# Patient Record
Sex: FEMALE | Race: WHITE | NOT HISPANIC OR LATINO | Employment: FULL TIME | URBAN - METROPOLITAN AREA
[De-identification: names, ages, dates, MRNs, and addresses within clinical notes are randomized per-mention and may not be internally consistent; named-entity substitution may affect disease eponyms.]

---

## 2017-01-27 ENCOUNTER — HOSPITAL ENCOUNTER (OUTPATIENT)
Dept: SLEEP MEDICINE | Facility: HOSPITAL | Age: 37
Discharge: HOME | End: 2017-01-27
Attending: INTERNAL MEDICINE | Admitting: INTERNAL MEDICINE

## 2017-01-27 VITALS
HEIGHT: 67 IN | BODY MASS INDEX: 33.4 KG/M2 | WEIGHT: 212.8 LBS | HEART RATE: 71 BPM | OXYGEN SATURATION: 95 % | SYSTOLIC BLOOD PRESSURE: 119 MMHG | DIASTOLIC BLOOD PRESSURE: 65 MMHG

## 2017-01-27 DIAGNOSIS — G47.10 HYPERSOMNIA: ICD-10-CM

## 2017-01-27 DIAGNOSIS — R06.83 SNORING: ICD-10-CM

## 2017-01-27 PROCEDURE — 95810 POLYSOM 6/> YRS 4/> PARAM: CPT | Performed by: INTERNAL MEDICINE

## 2017-01-28 ENCOUNTER — HOSPITAL ENCOUNTER (OUTPATIENT)
Dept: SLEEP MEDICINE | Facility: HOSPITAL | Age: 37
Discharge: HOME | End: 2017-01-28
Attending: INTERNAL MEDICINE | Admitting: INTERNAL MEDICINE

## 2017-01-28 VITALS
DIASTOLIC BLOOD PRESSURE: 65 MMHG | WEIGHT: 212 LBS | SYSTOLIC BLOOD PRESSURE: 119 MMHG | HEART RATE: 70 BPM | OXYGEN SATURATION: 97 % | HEIGHT: 67 IN | BODY MASS INDEX: 33.27 KG/M2 | RESPIRATION RATE: 16 BRPM

## 2017-01-28 DIAGNOSIS — G47.10 HYPERSOMNIA: ICD-10-CM

## 2017-01-28 LAB
AMPHET+METHAMPHET UR QL: NEGATIVE
AMPHETAMINES UR QL: NEGATIVE
BARBITURATES UR QL SCN: NEGATIVE
BENZODIAZ UR QL SCN: NEGATIVE
BUPRENORPHINE SERPL-MCNC: POSITIVE NG/ML
CANNABINOIDS SERPL QL: NEGATIVE
COCAINE UR QL: NEGATIVE
METHADONE UR QL SCN: NEGATIVE
OPIATES UR QL: NEGATIVE
OXYCODONE UR QL SCN: NEGATIVE
PCP UR QL SCN: NEGATIVE
PROPOXYPH UR QL: NEGATIVE
TRICYCLICS UR QL SCN: NEGATIVE

## 2017-01-28 PROCEDURE — 95805 MULTIPLE SLEEP LATENCY TEST: CPT | Performed by: INTERNAL MEDICINE

## 2017-01-28 PROCEDURE — 80306 DRUG TEST PRSMV INSTRMNT: CPT | Performed by: INTERNAL MEDICINE

## 2017-02-01 ENCOUNTER — OFFICE VISIT (OUTPATIENT)
Dept: SLEEP MEDICINE | Facility: HOSPITAL | Age: 37
End: 2017-02-01

## 2017-02-01 VITALS
HEIGHT: 67 IN | OXYGEN SATURATION: 96 % | WEIGHT: 208 LBS | HEART RATE: 62 BPM | BODY MASS INDEX: 32.65 KG/M2 | SYSTOLIC BLOOD PRESSURE: 136 MMHG | DIASTOLIC BLOOD PRESSURE: 74 MMHG

## 2017-02-01 DIAGNOSIS — R06.83 SNORING: Primary | ICD-10-CM

## 2017-02-01 DIAGNOSIS — G47.10 HYPERSOMNIA: ICD-10-CM

## 2017-02-01 PROCEDURE — 99214 OFFICE O/P EST MOD 30 MIN: CPT | Performed by: INTERNAL MEDICINE

## 2017-02-01 RX ORDER — NITROFURANTOIN 25; 75 MG/1; MG/1
CAPSULE ORAL
COMMUNITY
Start: 2017-01-03 | End: 2017-09-29

## 2017-02-01 RX ORDER — CIPROFLOXACIN 500 MG/1
TABLET, FILM COATED ORAL
COMMUNITY
Start: 2017-01-30 | End: 2017-09-29

## 2017-02-01 RX ORDER — METHYLPHENIDATE HYDROCHLORIDE 20 MG/1
20 TABLET ORAL 2 TIMES DAILY
Qty: 60 TABLET | Refills: 0 | Status: SHIPPED | OUTPATIENT
Start: 2017-02-01 | End: 2017-06-13 | Stop reason: SDUPTHER

## 2017-02-01 RX ORDER — CYCLOBENZAPRINE HCL 10 MG
TABLET ORAL
COMMUNITY
Start: 2017-01-03 | End: 2017-09-29

## 2017-02-01 RX ORDER — BETAMETHASONE DIPROPIONATE 0.5 MG/G
CREAM TOPICAL
COMMUNITY
Start: 2016-12-22 | End: 2020-01-10

## 2017-02-01 RX ORDER — AMOXICILLIN AND CLAVULANATE POTASSIUM 875; 125 MG/1; MG/1
TABLET, FILM COATED ORAL
COMMUNITY
Start: 2016-12-22 | End: 2017-09-29

## 2017-02-01 NOTE — PROGRESS NOTES
"Aleena Mcdermott is a 36 y.o. female is here today for follow-up.Patient's followed here with a history of snoring and hypersomnia.  She was last seen here since September 21.  Her primary care physician is Dr. Villarreal.    History of Present Illness  Patient was last seen here September 21.  She has a history of snoring and hypersomnia.  We had previously done polysomnogram that did not document significant sleep-disordered breathing.  She had a repeat polysomnogram last month and then stayed for [facility] SLT.  She thought she was very sleepy during the overnight part.  She had difficulty staying awake between the naps for her him SLT.  She apparently often gets home about 2 AM and then is up and down quite a bit during the night due to frequent urinations.  She has been helped some by being on the Ritalin  The following portions of the patient's history were reviewed and updated as appropriate: allergies, current medications and problem list.    Review of Systems   Constitutional: Negative.    HENT: Positive for congestion, postnasal drip and sinus pressure.    Eyes: Negative.    Respiratory: Positive for cough.    Cardiovascular: Negative.    Gastrointestinal: Positive for constipation.   Endocrine: Negative.    Genitourinary: Positive for dysuria and frequency.   Musculoskeletal: Positive for arthralgias, joint swelling and myalgias.   Skin: Positive for rash.   Allergic/Immunologic: Negative.    Neurological: Positive for headaches.   Hematological: Negative.    Psychiatric/Behavioral: Negative.    [place] scores 20/24    Objective    Visit Vitals   • /74   • Pulse 62   • Ht 67\" (170.2 cm)   • Wt 208 lb (94.3 kg)   • SpO2 96%   • BMI 32.58 kg/m2       Physical Exam   Constitutional: She is oriented to person, place, and time. She appears well-developed and well-nourished.   She is obese.   HENT:   Head: Normocephalic and atraumatic.   She has nasal airway narrowing and Mallampati class IV " anatomy   Eyes: EOM are normal. Pupils are equal, round, and reactive to light.   Neck: Normal range of motion. Neck supple.   Cardiovascular: Normal rate, regular rhythm and normal heart sounds.    Pulmonary/Chest: Effort normal and breath sounds normal.   Abdominal: Soft. Bowel sounds are normal.   Musculoskeletal: Normal range of motion. She exhibits no edema.   Neurological: She is alert and oriented to person, place, and time.   Skin: Skin is warm and dry.   Psychiatric: She has a normal mood and affect. Her behavior is normal.   her overnight polysomnogram showed a normal AHI.  She was noted to have arousals with just snoring.  These could not be scored for sleep-disordered breathing.  Her multiple sleep latency tests showed she went to sleep every nap she did not have REM sleep in any of the naps.  Her average sleep latency was 3 minutes 42 seconds which is significant.    Assessment/Plan   Shelby was seen today for follow-up.    Diagnoses and all orders for this visit:    Snoring    Hypersomnia  -     methylphenidate (RITALIN) 20 MG tablet; Take 1 tablet by mouth 2 (Two) Times a Day.    patient does have marked daytime hypersomnolence but to this point does not show evidence of narcolepsy.  We will try treating her hypersomnolence with the Ritalin we will increase her to 20 mg at breakfast and 20 mg at noon.  Her urine drug screen did show evidence of her Suboxone therapy.  Her Roney report was reviewed and was #[id number]    She is encouraged to lose weight.  She is encouraged to avoid alcohol and sedatives close to bedtime.  She is encouraged practice lateral position sleep.  She is to contact us when she needs a refill on the Ritalin.  We will see her in 6 months.    Alberto Marquez MD [facility]  Sleep Medicine  Pulmonary and Critical Care Medicine

## 2017-02-01 NOTE — PATIENT INSTRUCTIONS
Hypersomnia  Hypersomnia is when you feel extremely tired during the day even though you're getting plenty of sleep at night. You may need to take naps during the day, and you may also be extremely difficult to wake up when you are sleeping.   CAUSES   The cause of your hypersomnia may not be known. Hypersomnia may be caused by:   · Medicines.  · Sleep disorders, such as narcolepsy.  · Trauma or injury to your head or nervous system.  · Using drugs or alcohol.  · Tumors.  · Medical conditions, such as depression or hypothyroidism.  · Genetics.  SIGNS AND SYMPTOMS   The main symptoms of hypersomnia include:   · Feeling extremely tired throughout the day.  · Being very difficult to wake up.  · Sleeping for longer and longer periods.  · Taking naps throughout the day.  Other symptoms may include:   · Feeling:  ¨ Restless.  ¨ Annoyed.  ¨ Anxious.  ¨ Low energy.  · Having difficulty:  ¨ Remembering.  ¨ Speaking.  ¨ Thinking.  · Losing your appetite.  · Experiencing hallucinations.  DIAGNOSIS   Hypersomnia may be diagnosed by:  · Medical history and physical exam. This will include a sleep history.  · Completing sleep logs.  · Tests may also be done, such as:    Polysomnography.    Multiple sleep latency test (MSLT).  TREATMENT   There is no cure for hypersomnia, but treatment can be very effective in helping manage the condition. Treatment may include:  · Lifestyle and sleeping strategies to help cope with the condition.  · Stimulant medicines.  · Treating any underlying causes of hypersomnia.  HOME CARE INSTRUCTIONS  · Take medicines only as directed by your health care provider.  · Schedule short naps for when you feel sleepiest during the day. Tell your employer or teachers that you have hypersomnia. You may be able to adjust your schedule to include time for naps.  · Avoid drinking alcohol or caffeinated beverages.  · Do not eat a heavy meal before bedtime. Eat at about the same times every day.  · Do not drive or  operate heavy machinery if you are sleepy.  · Do not swim or go out on the water without a life jacket.  · If possible, adjust your schedule so that you do not have to work or be active at night.  · Keep all follow-up visits as directed by your health care provider. This is important.  SEEK MEDICAL CARE IF:   · You have new symptoms.   · Your symptoms get worse.  SEEK IMMEDIATE MEDICAL CARE IF:   You have serious thoughts of hurting yourself or someone else.      This information is not intended to replace advice given to you by your health care provider. Make sure you discuss any questions you have with your health care provider.     Document Released: 12/08/2003 Document Revised: 01/08/2016 Document Reviewed: 07/23/2015  [employer] Interactive Patient Education ©2016 Elsevier Inc.

## 2017-04-13 DIAGNOSIS — G47.10 HYPERSOMNIA: ICD-10-CM

## 2017-04-13 NOTE — TELEPHONE ENCOUNTER
Patient called and left voicemail requesting refill for Rx Ritalin 20 mg. Patient was last seen 2/1/2017.

## 2017-04-19 RX ORDER — METHYLPHENIDATE HYDROCHLORIDE 20 MG/1
20 TABLET ORAL 2 TIMES DAILY
Qty: 60 TABLET | Refills: 0 | Status: CANCELLED | OUTPATIENT
Start: 2017-04-19

## 2017-04-19 NOTE — TELEPHONE ENCOUNTER
Rx written  Alberto Marquez MD [facility]  Sleep Medicine  Pulmonary and Critical Care Medicine

## 2017-06-13 DIAGNOSIS — G47.10 HYPERSOMNIA: ICD-10-CM

## 2017-06-13 NOTE — TELEPHONE ENCOUNTER
Patient last seen 02- is requesting refill for Ritalin 20 Mg.      She has scheduled her appointment in August for 6 Month follow up.

## 2017-06-23 RX ORDER — METHYLPHENIDATE HYDROCHLORIDE 20 MG/1
20 TABLET ORAL 2 TIMES DAILY
Qty: 60 TABLET | Refills: 0 | Status: SHIPPED | OUTPATIENT
Start: 2017-06-23 | End: 2017-09-29 | Stop reason: ALTCHOICE

## 2017-06-23 NOTE — TELEPHONE ENCOUNTER
Approved  Alberto Marquez MD [facility]  Sleep Medicine  Pulmonary and Critical Care Medicine

## 2017-09-27 ENCOUNTER — DOCUMENTATION (OUTPATIENT)
Dept: SLEEP MEDICINE | Facility: HOSPITAL | Age: 37
End: 2017-09-27

## 2017-09-27 NOTE — PROGRESS NOTES
PATIENT CALLED AND SAID THAT SHE COULD NOT MAKE HER APPOINTMENT. THIS IS HER 2ND NO SHOW THIS WEEK. INFORMED PATIENT THAT SHE CANNOT GET ANY MORE RX UNTIL SHE FOLLOWS UP.

## 2017-09-29 ENCOUNTER — OFFICE VISIT (OUTPATIENT)
Dept: SLEEP MEDICINE | Facility: HOSPITAL | Age: 37
End: 2017-09-29

## 2017-09-29 VITALS
HEART RATE: 78 BPM | SYSTOLIC BLOOD PRESSURE: 132 MMHG | OXYGEN SATURATION: 96 % | DIASTOLIC BLOOD PRESSURE: 72 MMHG | BODY MASS INDEX: 32.96 KG/M2 | HEIGHT: 67 IN | WEIGHT: 210 LBS

## 2017-09-29 DIAGNOSIS — G47.10 HYPERSOMNIA: Primary | ICD-10-CM

## 2017-09-29 PROCEDURE — 99214 OFFICE O/P EST MOD 30 MIN: CPT | Performed by: INTERNAL MEDICINE

## 2017-09-29 RX ORDER — DEXTROAMPHETAMINE SACCHARATE, AMPHETAMINE ASPARTATE, DEXTROAMPHETAMINE SULFATE AND AMPHETAMINE SULFATE 2.5; 2.5; 2.5; 2.5 MG/1; MG/1; MG/1; MG/1
10 TABLET ORAL 2 TIMES DAILY
Qty: 60 TABLET | Refills: 0 | Status: SHIPPED | OUTPATIENT
Start: 2017-09-29 | End: 2017-12-06 | Stop reason: SDUPTHER

## 2017-09-29 NOTE — PROGRESS NOTES
Subjective   Shelby Mcdermott is a 37 y.o. female is here today for follow-up.  She is followed here with snoring and hypersomnia.  Her primary care physician is Dr. Villarreal    History of Present Illness  Patient was last seen February 1, 2017.  She is previously been shown to have snoring and had hypersomnia on MSLT.  She is been on Ritalin 20 mg twice a day.  She says she thinks it Ritalin losing some effectiveness.  She noted herself being sleepy over the past 2 weeks.  She is previously had holidays from this medicine trying Adderall and it  work well for her previously.  She says she's trying to decrease smoking and  is eating better.  She's also trying to decrease some of her other medications.  She is been on chronic narcotic replacement therapy.  Past Medical History:   Diagnosis Date   • Diabetes mellitus    • Disease of thyroid gland    • Hypertension        Past Surgical History:   Procedure Laterality Date   • HYSTERECTOMY             Current Outpatient Prescriptions:   •  amLODIPine (NORVASC) 5 MG tablet, , Disp: , Rfl:   •  betamethasone, augmented, (DIPROLENE) 0.05 % cream, , Disp: , Rfl:   •  citalopram (CeleXA) 20 MG tablet, , Disp: , Rfl:   •  glycopyrrolate (ROBINUL) 1 MG tablet, , Disp: , Rfl:   •  ibuprofen (ADVIL,MOTRIN) 800 MG tablet, , Disp: , Rfl:   •  levothyroxine (SYNTHROID, LEVOTHROID) 25 MCG tablet, , Disp: , Rfl:   •  lisinopril (PRINIVIL,ZESTRIL) 10 MG tablet, , Disp: , Rfl:   •  metFORMIN XR (GLUCOPHAGE-XR) 500 MG 24 hr tablet, , Disp: , Rfl:   •  methylphenidate (RITALIN) 20 MG tablet, Take 1 tablet by mouth 2 (Two) Times a Day., Disp: 60 tablet, Rfl: 0  •  methylphenidate (RITALIN) 20 MG tablet, Take 1 tablet by mouth 2 (Two) Times a Day., Disp: 60 tablet, Rfl: 0  •  omeprazole (PriLOSEC) 40 MG capsule, , Disp: , Rfl:   •  PREMARIN 1.25 MG tablet, , Disp: , Rfl:   •  rosuvastatin (CRESTOR) 20 MG tablet, , Disp: , Rfl:   •  SUBOXONE 8-2 MG film film, , Disp: , Rfl:   •  SUMAtriptan  "(IMITREX) 100 MG tablet, , Disp: , Rfl:     Allergies   Allergen Reactions   • Codeine    • Sulforcin [Resorcinol-Sulfur]        The following portions of the patient's history were reviewed and updated as appropriate: allergies, current medications and problem list.    Review of Systems   Constitutional: Negative.    HENT: Positive for congestion, postnasal drip and sinus pressure.    Eyes: Negative.    Respiratory: Positive for cough.    Cardiovascular: Negative.    Gastrointestinal: Negative.    Endocrine: Positive for polydipsia and polyuria.   Genitourinary: Positive for difficulty urinating.   Musculoskeletal: Positive for arthralgias and joint swelling.   Skin: Negative.    Allergic/Immunologic: Positive for environmental allergies.   Neurological: Positive for headaches.   Hematological: Negative.    Psychiatric/Behavioral: Negative.    [place] scores 20/24    Objective     /72  Pulse 78  Ht 67\" (170.2 cm)  Wt 210 lb (95.3 kg)  SpO2 96%  BMI 32.89 kg/m2    Physical Exam   Constitutional: She is oriented to person, place, and time. She appears well-developed and well-nourished.   She is obese.   HENT:   Head: Normocephalic and atraumatic.   She has Mallampati class III anatomy   Eyes: EOM are normal. Pupils are equal, round, and reactive to light.   Neck: Normal range of motion. Neck supple.   Cardiovascular: Normal rate, regular rhythm and normal heart sounds.    Pulmonary/Chest: Effort normal and breath sounds normal.   Abdominal: Soft. Bowel sounds are normal.   Musculoskeletal: Normal range of motion. She exhibits no edema.   Neurological: She is alert and oriented to person, place, and time.   Skin: Skin is warm and dry.   Psychiatric: She has a normal mood and affect. Her behavior is normal.         Assessment/Plan   Shelby was seen today for follow-up.    Diagnoses and all orders for this visit:    Hypersomnia  -     amphetamine-dextroamphetamine (ADDERALL) 10 MG tablet; Take 1 tablet by " mouth 2 (Two) Times a Day.    Her Roney report was reviewed.  We will discontinue her Ritalin.  We will substitute Adderall 10 mg once in the morning and once at noon.  I've written a prescription for this month and next.  She is contact us when she needs a refill.  We will see her back in 6 months.             Alberto Marquez MD [facility]  Sleep Medicine  Pulmonary and Critical Care Medicine      09/29/17  11:13 AM

## 2017-09-29 NOTE — PATIENT INSTRUCTIONS
Hypersomnia  Hypersomnia is when you feel extremely tired during the day even though you're getting plenty of sleep at night. You may need to take naps during the day, and you may also be extremely difficult to wake up when you are sleeping.   CAUSES   The cause of your hypersomnia may not be known. Hypersomnia may be caused by:   · Medicines.  · Sleep disorders, such as narcolepsy.  · Trauma or injury to your head or nervous system.  · Using drugs or alcohol.  · Tumors.  · Medical conditions, such as depression or hypothyroidism.  · Genetics.  SIGNS AND SYMPTOMS   The main symptoms of hypersomnia include:   · Feeling extremely tired throughout the day.  · Being very difficult to wake up.  · Sleeping for longer and longer periods.  · Taking naps throughout the day.  Other symptoms may include:   · Feeling:  ¨ Restless.  ¨ Annoyed.  ¨ Anxious.  ¨ Low energy.  · Having difficulty:  ¨ Remembering.  ¨ Speaking.  ¨ Thinking.  · Losing your appetite.  · Experiencing hallucinations.  DIAGNOSIS   Hypersomnia may be diagnosed by:  · Medical history and physical exam. This will include a sleep history.  · Completing sleep logs.  · Tests may also be done, such as:    Polysomnography.    Multiple sleep latency test (MSLT).  TREATMENT   There is no cure for hypersomnia, but treatment can be very effective in helping manage the condition. Treatment may include:  · Lifestyle and sleeping strategies to help cope with the condition.  · Stimulant medicines.  · Treating any underlying causes of hypersomnia.  HOME CARE INSTRUCTIONS  · Take medicines only as directed by your health care provider.  · Schedule short naps for when you feel sleepiest during the day. Tell your employer or teachers that you have hypersomnia. You may be able to adjust your schedule to include time for naps.  · Avoid drinking alcohol or caffeinated beverages.  · Do not eat a heavy meal before bedtime. Eat at about the same times every day.  · Do not drive or  operate heavy machinery if you are sleepy.  · Do not swim or go out on the water without a life jacket.  · If possible, adjust your schedule so that you do not have to work or be active at night.  · Keep all follow-up visits as directed by your health care provider. This is important.  SEEK MEDICAL CARE IF:   · You have new symptoms.   · Your symptoms get worse.  SEEK IMMEDIATE MEDICAL CARE IF:   You have serious thoughts of hurting yourself or someone else.      This information is not intended to replace advice given to you by your health care provider. Make sure you discuss any questions you have with your health care provider.     Document Released: 12/08/2003 Document Revised: 01/08/2016 Document Reviewed: 07/23/2015  [employer] Interactive Patient Education ©2017 Elsevier Inc.

## 2017-12-06 DIAGNOSIS — G47.10 HYPERSOMNIA: ICD-10-CM

## 2017-12-06 NOTE — TELEPHONE ENCOUNTER
Patient called requesting refill for adderall. She was last seen 9/29/2017. She will come to  presciption once signed.

## 2017-12-08 RX ORDER — DEXTROAMPHETAMINE SACCHARATE, AMPHETAMINE ASPARTATE, DEXTROAMPHETAMINE SULFATE AND AMPHETAMINE SULFATE 2.5; 2.5; 2.5; 2.5 MG/1; MG/1; MG/1; MG/1
10 TABLET ORAL 2 TIMES DAILY
Qty: 60 TABLET | Refills: 0 | Status: SHIPPED | OUTPATIENT
Start: 2017-12-08 | End: 2018-02-05 | Stop reason: SDUPTHER

## 2017-12-08 NOTE — TELEPHONE ENCOUNTER
Romie. Roney done last visit.  Alberto Marquez MD [facility]  Sleep Medicine  Pulmonary and Critical Care Medicine

## 2018-02-05 DIAGNOSIS — G47.10 HYPERSOMNIA: ICD-10-CM

## 2018-02-09 RX ORDER — DEXTROAMPHETAMINE SACCHARATE, AMPHETAMINE ASPARTATE, DEXTROAMPHETAMINE SULFATE AND AMPHETAMINE SULFATE 2.5; 2.5; 2.5; 2.5 MG/1; MG/1; MG/1; MG/1
10 TABLET ORAL 2 TIMES DAILY
Qty: 60 TABLET | Refills: 0 | Status: SHIPPED | OUTPATIENT
Start: 2018-02-09 | End: 2018-03-23 | Stop reason: SDUPTHER

## 2018-02-09 NOTE — TELEPHONE ENCOUNTER
Drake reviewed. Will refill.  Alberto Marquez MD [facility]  Sleep Medicine  Pulmonary and Critical Care Medicine

## 2018-03-23 ENCOUNTER — OFFICE VISIT (OUTPATIENT)
Dept: SLEEP MEDICINE | Facility: HOSPITAL | Age: 38
End: 2018-03-23

## 2018-03-23 VITALS
WEIGHT: 204 LBS | BODY MASS INDEX: 32.02 KG/M2 | OXYGEN SATURATION: 92 % | SYSTOLIC BLOOD PRESSURE: 142 MMHG | DIASTOLIC BLOOD PRESSURE: 80 MMHG | HEART RATE: 72 BPM | HEIGHT: 67 IN

## 2018-03-23 DIAGNOSIS — G47.10 HYPERSOMNIA: Primary | ICD-10-CM

## 2018-03-23 PROCEDURE — 99213 OFFICE O/P EST LOW 20 MIN: CPT | Performed by: INTERNAL MEDICINE

## 2018-03-23 RX ORDER — DEXTROAMPHETAMINE SACCHARATE, AMPHETAMINE ASPARTATE, DEXTROAMPHETAMINE SULFATE AND AMPHETAMINE SULFATE 2.5; 2.5; 2.5; 2.5 MG/1; MG/1; MG/1; MG/1
10 TABLET ORAL 2 TIMES DAILY
Qty: 60 TABLET | Refills: 0 | Status: SHIPPED | OUTPATIENT
Start: 2018-03-23 | End: 2018-05-30 | Stop reason: SDUPTHER

## 2018-03-23 NOTE — PROGRESS NOTES
Subjective   Shelby Mcdermott is a 37 y.o. female is here today for follow-up.  She is followed here with hypersomnia.  Her primary care physician is Dr. Jay Villarreal    History of Present Illness  Patient was last seen September 29, 2017.  She has hypersomnia and we changed to Adderall twice a day last time.  She has diabetes and hypertension.  She still rather sleepy during the day but she says she's learned how to manage it better.  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  Past Medical History:   Diagnosis Date   • Diabetes mellitus    • Disease of thyroid gland    • Hypertension        Past Surgical History:   Procedure Laterality Date   • HYSTERECTOMY             Current Outpatient Prescriptions:   •  amLODIPine (NORVASC) 5 MG tablet, , Disp: , Rfl:   •  amphetamine-dextroamphetamine (ADDERALL) 10 MG tablet, Take 1 tablet by mouth 2 (Two) Times a Day., Disp: 60 tablet, Rfl: 0  •  amphetamine-dextroamphetamine (ADDERALL) 10 MG tablet, Take 1 tablet by mouth 2 (Two) Times a Day., Disp: 60 tablet, Rfl: 0  •  betamethasone, augmented, (DIPROLENE) 0.05 % cream, , Disp: , Rfl:   •  citalopram (CeleXA) 20 MG tablet, , Disp: , Rfl:   •  glycopyrrolate (ROBINUL) 1 MG tablet, , Disp: , Rfl:   •  ibuprofen (ADVIL,MOTRIN) 800 MG tablet, , Disp: , Rfl:   •  levothyroxine (SYNTHROID, LEVOTHROID) 25 MCG tablet, , Disp: , Rfl:   •  lisinopril (PRINIVIL,ZESTRIL) 10 MG tablet, , Disp: , Rfl:   •  metFORMIN XR (GLUCOPHAGE-XR) 500 MG 24 hr tablet, , Disp: , Rfl:   •  omeprazole (PriLOSEC) 40 MG capsule, , Disp: , Rfl:   •  PREMARIN 1.25 MG tablet, , Disp: , Rfl:   •  rosuvastatin (CRESTOR) 20 MG tablet, , Disp: , Rfl:   •  SUBOXONE 8-2 MG film film, , Disp: , Rfl:   •  SUMAtriptan (IMITREX) 100 MG tablet, , Disp: , Rfl:     Allergies   Allergen Reactions   • Codeine    • Sulforcin [Resorcinol-Sulfur]        The following portions of the patient's history were reviewed and updated as  "appropriate: allergies, current medications and problem list.    Review of Systems   Constitutional: Positive for fever.   HENT: Positive for congestion and sinus pain.    Eyes: Negative.    Respiratory: Positive for cough and shortness of breath.    Cardiovascular: Negative.    Gastrointestinal: Negative.    Endocrine: Positive for polydipsia.   Genitourinary: Positive for difficulty urinating.   Musculoskeletal: Positive for arthralgias and joint swelling.   Skin: Negative.    Allergic/Immunologic: Positive for environmental allergies.   Neurological: Positive for headaches.   Hematological: Negative.    Psychiatric/Behavioral: Negative.    [place] 18/24    Objective     /80   Pulse 72   Ht 170.2 cm (67.01\")   Wt 92.5 kg (204 lb)   SpO2 92%   BMI 31.94 kg/m²     Physical Exam   Constitutional: She is oriented to person, place, and time. She appears well-developed and well-nourished.   She is obese.   HENT:   Head: Normocephalic and atraumatic.   She has Mallampati class III anatomy.   Eyes: EOM are normal. Pupils are equal, round, and reactive to light.   Neck: Normal range of motion. Neck supple.   Cardiovascular: Normal rate, regular rhythm and normal heart sounds.    Pulmonary/Chest: Effort normal and breath sounds normal.   Abdominal: Soft. Bowel sounds are normal.   Musculoskeletal: Normal range of motion. She exhibits no edema.   Neurological: She is alert and oriented to person, place, and time.   Skin: Skin is warm and dry.   Psychiatric: She has a normal mood and affect. Her behavior is normal.         Assessment/Plan   Shelby was seen today for follow-up.    Diagnoses and all orders for this visit:    Hypersomnia  -     amphetamine-dextroamphetamine (ADDERALL) 10 MG tablet; Take 1 tablet by mouth 2 (Two) Times a Day.    Patient seems be doing fairly well on the Adderall twice a day.  We will continue on that.  Her Roney report was reviewed last month.  We'll write a prescription for this " month and then to be filled next month.  She is to return in 6 months.  She is to contact us earlier symptoms worsen.  She is urged to achieve ideal body weight.  She is encouraged to avoid alcohol and sedatives close to bedtime.  She is encouraged practice lateral position sleep             Alberto Marquez MD [facility]  Sleep Medicine  Pulmonary and Critical Care Medicine      03/23/18  2:06 PM

## 2018-03-23 NOTE — PATIENT INSTRUCTIONS
Hypersomnia  Hypersomnia is when you feel extremely tired during the day even though you're getting plenty of sleep at night. You may need to take naps during the day, and you may also be extremely difficult to wake up when you are sleeping.  What are the causes?  The cause of your hypersomnia may not be known. Hypersomnia may be caused by:  · Medicines.  · Sleep disorders, such as narcolepsy.  · Trauma or injury to your head or nervous system.  · Using drugs or alcohol.  · Tumors.  · Medical conditions, such as depression or hypothyroidism.  · Genetics.  What are the signs or symptoms?  The main symptoms of hypersomnia include:  · Feeling extremely tired throughout the day.  · Being very difficult to wake up.  · Sleeping for longer and longer periods.  · Taking naps throughout the day.  Other symptoms may include:  · Feeling:  ¨ Restless.  ¨ Annoyed.  ¨ Anxious.  ¨ Low energy.  · Having difficulty:  ¨ Remembering.  ¨ Speaking.  ¨ Thinking.  · Losing your appetite.  · Experiencing hallucinations.  How is this diagnosed?  Hypersomnia may be diagnosed by:  · Medical history and physical exam. This will include a sleep history.  · Completing sleep logs.  · Tests may also be done, such as:  ¨ Polysomnography.  ¨ Multiple sleep latency test (MSLT).  How is this treated?  There is no cure for hypersomnia, but treatment can be very effective in helping manage the condition. Treatment may include:  · Lifestyle and sleeping strategies to help cope with the condition.  · Stimulant medicines.  · Treating any underlying causes of hypersomnia.  Follow these instructions at home:  · Take medicines only as directed by your health care provider.  · Schedule short naps for when you feel sleepiest during the day. Tell your employer or teachers that you have hypersomnia. You may be able to adjust your schedule to include time for naps.  · Avoid drinking alcohol or caffeinated beverages.  · Do not eat a heavy meal before bedtime. Eat  at about the same times every day.  · Do not drive or operate heavy machinery if you are sleepy.  · Do not swim or go out on the water without a life jacket.  · If possible, adjust your schedule so that you do not have to work or be active at night.  · Keep all follow-up visits as directed by your health care provider. This is important.  Contact a health care provider if:  · You have new symptoms.  · Your symptoms get worse.  Get help right away if:  You have serious thoughts of hurting yourself or someone else.  This information is not intended to replace advice given to you by your health care provider. Make sure you discuss any questions you have with your health care provider.  Document Released: 12/08/2003 Document Revised: 05/25/2017 Document Reviewed: 07/23/2015  [employer] Interactive Patient Education © 2017 [employer] Inc.

## 2018-05-30 DIAGNOSIS — G47.10 HYPERSOMNIA: ICD-10-CM

## 2018-06-07 RX ORDER — DEXTROAMPHETAMINE SACCHARATE, AMPHETAMINE ASPARTATE, DEXTROAMPHETAMINE SULFATE AND AMPHETAMINE SULFATE 2.5; 2.5; 2.5; 2.5 MG/1; MG/1; MG/1; MG/1
10 TABLET ORAL 2 TIMES DAILY
Qty: 60 TABLET | Refills: 0 | Status: SHIPPED | OUTPATIENT
Start: 2018-06-07 | End: 2018-08-08 | Stop reason: SDUPTHER

## 2018-06-07 NOTE — TELEPHONE ENCOUNTER
Roney reviewed. [place] approved  Alberto Marquez MD [facility]  Sleep Medicine  Pulmonary and Critical Care Medicine

## 2018-08-08 DIAGNOSIS — G47.10 HYPERSOMNIA: ICD-10-CM

## 2018-08-10 RX ORDER — DEXTROAMPHETAMINE SACCHARATE, AMPHETAMINE ASPARTATE, DEXTROAMPHETAMINE SULFATE AND AMPHETAMINE SULFATE 2.5; 2.5; 2.5; 2.5 MG/1; MG/1; MG/1; MG/1
10 TABLET ORAL 2 TIMES DAILY
Qty: 60 TABLET | Refills: 0 | Status: SHIPPED | OUTPATIENT
Start: 2018-08-10 | End: 2018-10-15

## 2018-08-10 NOTE — TELEPHONE ENCOUNTER
Roney done 6/5/18.  Will renew adderall  Alberto Marquez MD [facility]  Sleep Medicine  Pulmonary and Critical Care Medicine

## 2018-10-15 ENCOUNTER — OFFICE VISIT (OUTPATIENT)
Dept: SLEEP MEDICINE | Facility: HOSPITAL | Age: 38
End: 2018-10-15

## 2018-10-15 VITALS
WEIGHT: 199.2 LBS | HEART RATE: 77 BPM | SYSTOLIC BLOOD PRESSURE: 146 MMHG | OXYGEN SATURATION: 93 % | DIASTOLIC BLOOD PRESSURE: 82 MMHG | BODY MASS INDEX: 31.27 KG/M2 | HEIGHT: 67 IN | TEMPERATURE: 97 F

## 2018-10-15 DIAGNOSIS — G47.10 HYPERSOMNIA: ICD-10-CM

## 2018-10-15 DIAGNOSIS — G47.10 HYPERSOMNIA: Primary | ICD-10-CM

## 2018-10-15 PROCEDURE — 99213 OFFICE O/P EST LOW 20 MIN: CPT | Performed by: NURSE PRACTITIONER

## 2018-10-15 RX ORDER — DEXTROAMPHETAMINE SACCHARATE, AMPHETAMINE ASPARTATE, DEXTROAMPHETAMINE SULFATE AND AMPHETAMINE SULFATE 5; 5; 5; 5 MG/1; MG/1; MG/1; MG/1
20 TABLET ORAL 2 TIMES DAILY
Qty: 60 TABLET | Refills: 0 | Status: SHIPPED | OUTPATIENT
Start: 2018-10-15 | End: 2018-12-05 | Stop reason: SDUPTHER
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
## 2018-10-15 NOTE — PATIENT INSTRUCTIONS
Hypersomnia  Hypersomnia is when you feel extremely tired during the day even though you're getting plenty of sleep at night. You may need to take naps during the day, and you may also be extremely difficult to wake up when you are sleeping.  What are the causes?  The cause of your hypersomnia may not be known. Hypersomnia may be caused by:  · Medicines.  · Sleep disorders, such as narcolepsy.  · Trauma or injury to your head or nervous system.  · Using drugs or alcohol.  · Tumors.  · Medical conditions, such as depression or hypothyroidism.  · Genetics.    What are the signs or symptoms?  The main symptoms of hypersomnia include:  · Feeling extremely tired throughout the day.  · Being very difficult to wake up.  · Sleeping for longer and longer periods.  · Taking naps throughout the day.    Other symptoms may include:  · Feeling:  ? Restless.  ? Annoyed.  ? Anxious.  ? Low energy.  · Having difficulty:  ? Remembering.  ? Speaking.  ? Thinking.  · Losing your appetite.  · Experiencing hallucinations.    How is this diagnosed?  Hypersomnia may be diagnosed by:  · Medical history and physical exam. This will include a sleep history.  · Completing sleep logs.  · Tests may also be done, such as:  ? Polysomnography.  ? Multiple sleep latency test (MSLT).    How is this treated?  There is no cure for hypersomnia, but treatment can be very effective in helping manage the condition. Treatment may include:  · Lifestyle and sleeping strategies to help cope with the condition.  · Stimulant medicines.  · Treating any underlying causes of hypersomnia.    Follow these instructions at home:  · Take medicines only as directed by your health care provider.  · Schedule short naps for when you feel sleepiest during the day. Tell your employer or teachers that you have hypersomnia. You may be able to adjust your schedule to include time for naps.  · Avoid drinking alcohol or caffeinated beverages.  · Do not eat a heavy meal before  bedtime. Eat at about the same times every day.  · Do not drive or operate heavy machinery if you are sleepy.  · Do not swim or go out on the water without a life jacket.  · If possible, adjust your schedule so that you do not have to work or be active at night.  · Keep all follow-up visits as directed by your health care provider. This is important.  Contact a health care provider if:  · You have new symptoms.  · Your symptoms get worse.  Get help right away if:  You have serious thoughts of hurting yourself or someone else.  This information is not intended to replace advice given to you by your health care provider. Make sure you discuss any questions you have with your health care provider.  Document Released: 12/08/2003 Document Revised: 05/25/2017 Document Reviewed: 07/23/2015  [employer] Interactive Patient Education © 2018 [employer]

## 2018-12-03 RX ORDER — DEXTROAMPHETAMINE SACCHARATE, AMPHETAMINE ASPARTATE, DEXTROAMPHETAMINE SULFATE AND AMPHETAMINE SULFATE 5; 5; 5; 5 MG/1; MG/1; MG/1; MG/1
20 TABLET ORAL 2 TIMES DAILY
Qty: 60 TABLET | Refills: 0 | OUTPATIENT
Start: 2018-12-03

## 2018-12-14 RX ORDER — DEXTROAMPHETAMINE SACCHARATE, AMPHETAMINE ASPARTATE, DEXTROAMPHETAMINE SULFATE AND AMPHETAMINE SULFATE 5; 5; 5; 5 MG/1; MG/1; MG/1; MG/1
20 TABLET ORAL 2 TIMES DAILY
Qty: 60 TABLET | Refills: 0 | Status: SHIPPED | OUTPATIENT
Start: 2018-12-14 | End: 2019-01-18 | Stop reason: SDUPTHER

## 2019-01-18 DIAGNOSIS — G47.10 HYPERSOMNIA: Primary | ICD-10-CM

## 2019-01-18 RX ORDER — DEXTROAMPHETAMINE SACCHARATE, AMPHETAMINE ASPARTATE, DEXTROAMPHETAMINE SULFATE AND AMPHETAMINE SULFATE 5; 5; 5; 5 MG/1; MG/1; MG/1; MG/1
20 TABLET ORAL 2 TIMES DAILY
Qty: 60 TABLET | Refills: 0 | Status: SHIPPED | OUTPATIENT
Start: 2019-01-18 | End: 2019-04-03

## 2019-01-18 NOTE — PROGRESS NOTES
Patient requested refill on her Adderall.  Roney report is reviewed and is #[id number].  We will eprescribe her Adderall.    Alberto Marquez MD [facility]  Sleep Medicine  Pulmonary and Critical Care Medicine

## 2019-02-14 ENCOUNTER — OFFICE VISIT (OUTPATIENT)
Dept: SLEEP MEDICINE | Facility: HOSPITAL | Age: 39
End: 2019-02-14

## 2019-02-14 VITALS
HEIGHT: 67 IN | BODY MASS INDEX: 31.64 KG/M2 | HEART RATE: 78 BPM | DIASTOLIC BLOOD PRESSURE: 72 MMHG | WEIGHT: 201.6 LBS | OXYGEN SATURATION: 93 % | SYSTOLIC BLOOD PRESSURE: 139 MMHG

## 2019-02-14 DIAGNOSIS — G47.10 HYPERSOMNIA: Primary | ICD-10-CM

## 2019-02-14 PROCEDURE — 99213 OFFICE O/P EST LOW 20 MIN: CPT | Performed by: NURSE PRACTITIONER

## 2019-02-14 NOTE — PATIENT INSTRUCTIONS
Hypersomnia  Hypersomnia is when you feel extremely tired during the day even though you're getting plenty of sleep at night. You may need to take naps during the day, and you may also be extremely difficult to wake up when you are sleeping.  What are the causes?  The cause of your hypersomnia may not be known. Hypersomnia may be caused by:  · Medicines.  · Sleep disorders, such as narcolepsy.  · Trauma or injury to your head or nervous system.  · Using drugs or alcohol.  · Tumors.  · Medical conditions, such as depression or hypothyroidism.  · Genetics.    What are the signs or symptoms?  The main symptoms of hypersomnia include:  · Feeling extremely tired throughout the day.  · Being very difficult to wake up.  · Sleeping for longer and longer periods.  · Taking naps throughout the day.    Other symptoms may include:  · Feeling:  ? Restless.  ? Annoyed.  ? Anxious.  ? Low energy.  · Having difficulty:  ? Remembering.  ? Speaking.  ? Thinking.  · Losing your appetite.  · Experiencing hallucinations.    How is this diagnosed?  Hypersomnia may be diagnosed by:  · Medical history and physical exam. This will include a sleep history.  · Completing sleep logs.  · Tests may also be done, such as:  ? Polysomnography.  ? Multiple sleep latency test (MSLT).    How is this treated?  There is no cure for hypersomnia, but treatment can be very effective in helping manage the condition. Treatment may include:  · Lifestyle and sleeping strategies to help cope with the condition.  · Stimulant medicines.  · Treating any underlying causes of hypersomnia.    Follow these instructions at home:  · Take medicines only as directed by your health care provider.  · Schedule short naps for when you feel sleepiest during the day. Tell your employer or teachers that you have hypersomnia. You may be able to adjust your schedule to include time for naps.  · Avoid drinking alcohol or caffeinated beverages.  · Do not eat a heavy meal before  bedtime. Eat at about the same times every day.  · Do not drive or operate heavy machinery if you are sleepy.  · Do not swim or go out on the water without a life jacket.  · If possible, adjust your schedule so that you do not have to work or be active at night.  · Keep all follow-up visits as directed by your health care provider. This is important.  Contact a health care provider if:  · You have new symptoms.  · Your symptoms get worse.  Get help right away if:  You have serious thoughts of hurting yourself or someone else.  This information is not intended to replace advice given to you by your health care provider. Make sure you discuss any questions you have with your health care provider.  Document Released: 12/08/2003 Document Revised: 05/25/2017 Document Reviewed: 07/23/2015  [employer] Interactive Patient Education © 2018 Elsevier Inc.
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
## 2019-04-03 RX ORDER — DEXTROAMPHETAMINE SACCHARATE, AMPHETAMINE ASPARTATE, DEXTROAMPHETAMINE SULFATE AND AMPHETAMINE SULFATE 7.5; 7.5; 7.5; 7.5 MG/1; MG/1; MG/1; MG/1
30 TABLET ORAL EVERY MORNING
Qty: 60 TABLET | Refills: 0 | Status: SHIPPED | OUTPATIENT
Start: 2019-04-03 | End: 2019-06-07 | Stop reason: SDUPTHER

## 2019-06-07 ENCOUNTER — OFFICE VISIT (OUTPATIENT)
Dept: SLEEP MEDICINE | Facility: HOSPITAL | Age: 39
End: 2019-06-07

## 2019-06-07 VITALS
BODY MASS INDEX: 30.89 KG/M2 | DIASTOLIC BLOOD PRESSURE: 73 MMHG | OXYGEN SATURATION: 95 % | WEIGHT: 196.8 LBS | SYSTOLIC BLOOD PRESSURE: 146 MMHG | HEART RATE: 74 BPM | HEIGHT: 67 IN

## 2019-06-07 DIAGNOSIS — G47.10 HYPERSOMNIA: Primary | ICD-10-CM

## 2019-06-07 PROCEDURE — 99213 OFFICE O/P EST LOW 20 MIN: CPT | Performed by: NURSE PRACTITIONER

## 2019-06-07 RX ORDER — DEXTROAMPHETAMINE SACCHARATE, AMPHETAMINE ASPARTATE, DEXTROAMPHETAMINE SULFATE AND AMPHETAMINE SULFATE 7.5; 7.5; 7.5; 7.5 MG/1; MG/1; MG/1; MG/1
30 TABLET ORAL 2 TIMES DAILY
Qty: 120 TABLET | Refills: 0 | Status: SHIPPED | OUTPATIENT
Start: 2019-06-07 | End: 2019-08-08 | Stop reason: SDUPTHER

## 2019-06-07 RX ORDER — DEXTROAMPHETAMINE SACCHARATE, AMPHETAMINE ASPARTATE, DEXTROAMPHETAMINE SULFATE AND AMPHETAMINE SULFATE 7.5; 7.5; 7.5; 7.5 MG/1; MG/1; MG/1; MG/1
30 TABLET ORAL 2 TIMES DAILY
Qty: 120 TABLET | Refills: 0 | Status: SHIPPED | OUTPATIENT
Start: 2019-06-07 | End: 2019-06-07 | Stop reason: SDUPTHER

## 2019-06-07 NOTE — PROGRESS NOTES
Subjective: Follow-up      Chief Complaint:   Chief Complaint   Patient presents with   • Follow-up       HPI:    Shelby Mcdermott is a 39 y.o. female here for follow-up of hypersomnia.  Patient was last seen 2/14/2019 and her Adderall was increased to 30 mg twice daily.  Patient states she is doing much better on this dosage.  Patient is able to work her 8 hours without napping but does stop each night on the way home from work to take a nap.  These usually last from 30 minutes to 2 hours and then she will proceed home without difficulty.  Patient sleeps approximately 7 to 8 hours nightly and does feel refreshed upon awakening.  Patient has an [place] score of 14/24.  Patient does request to continue Adderall at her current dose.        Current medications are:   Current Outpatient Medications:   •  amLODIPine (NORVASC) 5 MG tablet, , Disp: , Rfl:   •  amphetamine-dextroamphetamine (ADDERALL) 30 MG tablet, Take 1 tablet by mouth Every Morning with meal and 1 tablet by mouth at noon for hypersomnia., Disp: 60 tablet, Rfl: 0  •  betamethasone, augmented, (DIPROLENE) 0.05 % cream, , Disp: , Rfl:   •  citalopram (CeleXA) 20 MG tablet, , Disp: , Rfl:   •  glycopyrrolate (ROBINUL) 1 MG tablet, , Disp: , Rfl:   •  ibuprofen (ADVIL,MOTRIN) 800 MG tablet, , Disp: , Rfl:   •  levothyroxine (SYNTHROID, LEVOTHROID) 25 MCG tablet, , Disp: , Rfl:   •  lisinopril (PRINIVIL,ZESTRIL) 10 MG tablet, , Disp: , Rfl:   •  metFORMIN XR (GLUCOPHAGE-XR) 500 MG 24 hr tablet, , Disp: , Rfl:   •  omeprazole (PriLOSEC) 40 MG capsule, , Disp: , Rfl:   •  PREMARIN 1.25 MG tablet, , Disp: , Rfl:   •  rosuvastatin (CRESTOR) 20 MG tablet, , Disp: , Rfl:   •  SUBOXONE 8-2 MG film film, , Disp: , Rfl:   •  SUMAtriptan (IMITREX) 100 MG tablet, , Disp: , Rfl: .      The patient's relevant past medical, surgical, family and social history were reviewed and updated in Epic as appropriate.       Review of Systems   Constitutional: Positive for fatigue.    Gastrointestinal:        Reflux   Neurological: Positive for headaches.   Psychiatric/Behavioral: Positive for dysphoric mood and sleep disturbance. The patient is nervous/anxious.    All other systems reviewed and are negative.        Objective:    Physical Exam   Constitutional: She is oriented to person, place, and time. She appears well-developed and well-nourished.   HENT:   Head: Normocephalic and atraumatic.   Mouth/Throat: Oropharynx is clear and moist.   Eyes: Conjunctivae are normal.   Neck: Neck supple. No thyromegaly present.   Cardiovascular: Normal rate and regular rhythm.   Pulmonary/Chest: Effort normal and breath sounds normal.   Lymphadenopathy:     She has no cervical adenopathy.   Neurological: She is alert and oriented to person, place, and time.   Skin: Skin is warm and dry.   Psychiatric: She has a normal mood and affect. Her behavior is normal. Judgment and thought content normal.   Nursing note and vitals reviewed.        ASSESSMENT/PLAN    Shelby was seen today for follow-up.    Diagnoses and all orders for this visit:    Hypersomnia            1. Counseled patient regarding multimodal approach with healthy nutrition, healthy sleep, regular physical activity, social activities, counseling, and medications. Encouraged to practice  Lateral sleep position. Avoid alcohol and sedatives close to bedtime.  2. Adderall 30 mg twice daily to be sent in per MD. Sim is compliant and has been reviewed today.    I have reviewed the results of my evaluation and impression and discussed my recommendations in detail with the patient.      Signed by  ANIL Schafer    June 7, 2019      CC: Jay Villarreal DO          No ref. provider found

## 2019-08-09 RX ORDER — DEXTROAMPHETAMINE SACCHARATE, AMPHETAMINE ASPARTATE, DEXTROAMPHETAMINE SULFATE AND AMPHETAMINE SULFATE 7.5; 7.5; 7.5; 7.5 MG/1; MG/1; MG/1; MG/1
30 TABLET ORAL 2 TIMES DAILY
Qty: 120 TABLET | Refills: 0 | Status: SHIPPED | OUTPATIENT
Start: 2019-08-09 | End: 2019-09-27 | Stop reason: SDUPTHER

## 2019-08-09 NOTE — TELEPHONE ENCOUNTER
Roney report reviewed six 7/19.  We will refill her prescription  Alberto Marquez MD [facility]  Sleep Medicine  Pulmonary and Critical Care Medicine

## 2019-09-23 ENCOUNTER — TELEPHONE (OUTPATIENT)
Dept: SLEEP MEDICINE | Facility: HOSPITAL | Age: 39
End: 2019-09-23

## 2019-09-23 NOTE — TELEPHONE ENCOUNTER
Patient states that her adderall was called into the wrong pharmacy. It was called into [facility] in [place] instead of [place]. She state that she drove to [place] to get her pharmacy but they only allowed a quantity of 60. She went back to [place] to get the other 60 tablets about 32 days later and they would not fill it since it was over the 30 day time frame that is allowed. She was wondering if her rx could be sent to [facility] pharmacy in [place]. Her next appt is 10/22/19.

## 2019-09-27 DIAGNOSIS — G47.10 HYPERSOMNIA: Primary | ICD-10-CM

## 2019-09-27 RX ORDER — DEXTROAMPHETAMINE SACCHARATE, AMPHETAMINE ASPARTATE, DEXTROAMPHETAMINE SULFATE AND AMPHETAMINE SULFATE 7.5; 7.5; 7.5; 7.5 MG/1; MG/1; MG/1; MG/1
30 TABLET ORAL 2 TIMES DAILY
Qty: 120 TABLET | Refills: 0 | Status: SHIPPED | OUTPATIENT
Start: 2019-09-27 | End: 2020-01-10

## 2019-09-27 NOTE — PROGRESS NOTES
The patient is requesting Adderall prescription be sent to [facility] pharmacy in [place] rather than pharmacy in [place].  Alberto Marquez MD [facility]  Sleep Medicine  Pulmonary and Critical Care Medicine

## 2020-01-10 ENCOUNTER — OFFICE VISIT (OUTPATIENT)
Dept: SLEEP MEDICINE | Facility: HOSPITAL | Age: 40
End: 2020-01-10

## 2020-01-10 VITALS
SYSTOLIC BLOOD PRESSURE: 129 MMHG | BODY MASS INDEX: 30.23 KG/M2 | HEART RATE: 73 BPM | WEIGHT: 193 LBS | DIASTOLIC BLOOD PRESSURE: 79 MMHG | TEMPERATURE: 99.7 F | RESPIRATION RATE: 18 BRPM | OXYGEN SATURATION: 96 %

## 2020-01-10 DIAGNOSIS — G47.10 HYPERSOMNIA: Primary | ICD-10-CM

## 2020-01-10 PROCEDURE — 99213 OFFICE O/P EST LOW 20 MIN: CPT | Performed by: INTERNAL MEDICINE

## 2020-01-10 RX ORDER — DEXTROAMPHETAMINE SACCHARATE, AMPHETAMINE ASPARTATE, DEXTROAMPHETAMINE SULFATE AND AMPHETAMINE SULFATE 7.5; 7.5; 7.5; 7.5 MG/1; MG/1; MG/1; MG/1
30 TABLET ORAL 2 TIMES DAILY
Qty: 60 TABLET | Refills: 0 | Status: SHIPPED | OUTPATIENT
Start: 2020-01-10 | End: 2020-03-19 | Stop reason: SDUPTHER

## 2020-01-10 NOTE — PROGRESS NOTES
Subjective   Shelby Mcdemrott is a 39 y.o. female is here today for follow-up.  She is seen for follow-up of hypersomnia.  Her primary care physician is Dr. Villarreal.    History of Present Illness  Patient was last seen his clinic June 7.  She has hypersomnia and has been on Adderall.  She states she is doing fairly well on her current dose.  She says she still will be pretty sleepy if she works longer hours.  She is not having a problem falling asleep on her current dose of Adderall.  Does not make her jittery.  Past Medical History:   Diagnosis Date   • Diabetes mellitus (CMS/HCC)    • Disease of thyroid gland    • Hypertension        Past Surgical History:   Procedure Laterality Date   • HYSTERECTOMY             Current Outpatient Medications:   •  amphetamine-dextroamphetamine (ADDERALL) 30 MG tablet, Take 1 tablet by mouth 2 (Two) Times a Day for 60 days., Disp: 120 tablet, Rfl: 0  •  citalopram (CeleXA) 20 MG tablet, , Disp: , Rfl:   •  estradiol (ESTRACE) 0.5 MG tablet, Take 0.5 mg by mouth Daily., Disp: , Rfl: 2  •  ibuprofen (ADVIL,MOTRIN) 800 MG tablet, , Disp: , Rfl:   •  levothyroxine (SYNTHROID, LEVOTHROID) 25 MCG tablet, , Disp: , Rfl:   •  lisinopril (PRINIVIL,ZESTRIL) 10 MG tablet, , Disp: , Rfl:   •  metFORMIN XR (GLUCOPHAGE-XR) 500 MG 24 hr tablet, , Disp: , Rfl:   •  omeprazole (PriLOSEC) 40 MG capsule, , Disp: , Rfl:   •  rosuvastatin (CRESTOR) 20 MG tablet, , Disp: , Rfl:   •  SUBOXONE 8-2 MG film film, , Disp: , Rfl:   •  SUMAtriptan (IMITREX) 100 MG tablet, , Disp: , Rfl:     Allergies   Allergen Reactions   • Sulforcin [Resorcinol-Sulfur] Hives   • Codeine Rash and GI Intolerance       The following portions of the patient's history were reviewed and updated as appropriate: allergies, current medications and problem list.    Review of Systems   Constitutional: Negative.    HENT: Positive for congestion and postnasal drip.    Eyes: Negative.    Respiratory: Negative.    Cardiovascular:  Negative.    Gastrointestinal: Negative.    Endocrine: Positive for cold intolerance and polydipsia.   Genitourinary: Negative.    Musculoskeletal: Negative.    Skin: Negative.    Allergic/Immunologic: Positive for environmental allergies.   Neurological: Negative.    Hematological: Negative.    Psychiatric/Behavioral: Negative.    [place] score is 18/24    Objective     /79   Pulse 73   Temp 99.7 °F (37.6 °C) (Temporal)   Resp 18   Wt 87.5 kg (193 lb)   SpO2 96%   BMI 30.23 kg/m²     Physical Exam   Constitutional: She is oriented to person, place, and time. She appears well-developed and well-nourished.   She is overweight.   HENT:   Head: Normocephalic and atraumatic.   She has Mallampati class IV anatomy.   Eyes: Pupils are equal, round, and reactive to light. EOM are normal.   Neck: Normal range of motion. Neck supple.   Cardiovascular: Normal rate, regular rhythm and normal heart sounds.   Pulmonary/Chest: Effort normal and breath sounds normal.   Abdominal: Soft. Bowel sounds are normal.   Musculoskeletal: Normal range of motion. She exhibits no edema.   Neurological: She is alert and oriented to person, place, and time.   Skin: Skin is warm and dry.   Psychiatric: She has a normal mood and affect. Her behavior is normal.         Assessment/Plan   Shelby was seen today for follow-up.    Diagnoses and all orders for this visit:    Hypersomnia  -     amphetamine-dextroamphetamine (ADDERALL) 30 MG tablet; Take 1 tablet by mouth 2 (Two) Times a Day for 30 days.    Patient seems to be doing fairly well on her current dosage.  We will refill her prescription.  Her Roney report is reviewed #[id number].  She is to return then in 6 months.  She is to contact us earlier if symptoms worsen.  She is to contact us if she needs refill on her prescriptions.  She is encouraged to achieve ideal body weight.  She is encouraged to practice excellent sleep hygiene.  She is to avoid alcohol and slows to bedtime.  She  is to practice lateral position sleep.             Alberto Marquez MD [facility]  Sleep Medicine  Pulmonary and Critical Care Medicine      01/10/20  3:24 PM

## 2020-01-10 NOTE — PATIENT INSTRUCTIONS
Hypersomnia  Hypersomnia is a condition in which a person feels very tired during the day even though he or she gets plenty of sleep at night. A person with this condition may take naps during the day and may find it very difficult to wake up from sleep. Hypersomnia may affect a person's ability to think, concentrate, drive, or remember things.  What are the causes?  The cause of this condition may not be known. Possible causes include:  · Certain medicines.  · Sleep disorders, such as narcolepsy and sleep apnea.  · Injury to the head, brain, or spinal cord.  · Drug or alcohol use.  · Gastroesophageal reflux disease (GERD).  · Tumors.  · Certain medical conditions, such as depression, diabetes, or an underactive thyroid gland (hypothyroidism).  What are the signs or symptoms?  The main symptoms of hypersomnia include:  · Feeling very tired throughout the day, regardless of how much sleep you got the night before.  · Having trouble waking up. Others may find it difficult to wake you up when you are sleeping.  · Sleeping for longer and longer periods at a time.  · Taking naps throughout the day.  Other symptoms may include:  · Feeling restless, anxious, or annoyed.  · Lacking energy.  · Having trouble with:  ? Remembering.  ? Speaking.  ? Thinking.  · Loss of appetite.  · Seeing, hearing, tasting, smelling, or feeling things that are not real (hallucinations).  How is this diagnosed?  This condition may be diagnosed based on:  · Your symptoms and medical history.  · Your sleeping habits. Your health care provider may ask you to write down your sleeping habits in a daily sleep log, along with any symptoms you have.  · A series of tests that are done while you sleep (sleep study or polysomnogram).  · A test that measures how quickly you can fall asleep during the day (daytime nap study or multiple sleep latency test).  How is this treated?  Treatment can help you manage your condition. Treatment may  include:  · Following a regular sleep routine.  · Lifestyle changes, such as changing your eating habits, getting regular exercise, and avoiding alcohol or caffeinated beverages.  · Taking medicines to make you more alert (stimulants) during the day.  · Treating any underlying medical causes of hypersomnia.  Follow these instructions at home:  Sleep routine    · Schedule the same bedtime and wake-up time each day.  · Practice a relaxing bedtime routine. This may include reading, meditation, deep breathing, or taking a warm bath before going to sleep.  · Get regular exercise each day. Avoid strenuous exercise in the evening hours.  · Keep your sleep environment at a cooler temperature, darkened, and quiet.  · Sleep with pillows and a mattress that are comfortable and supportive.  · Schedule short 20-minute naps for when you feel sleepiest during the day.  · Talk with your employer or teachers about your hypersomnia. If possible, adjust your schedule so that:  ? You have a regular daytime work schedule.  ? You can take a scheduled nap during the day.  ? You do not have to work or be active at night.  · Do not eat a heavy meal for a few hours before bedtime. Eat your meals at about the same times every day.  · Avoid drinking alcohol or caffeinated beverages.  Safety    · Do not drive or use heavy machinery if you are sleepy. Ask your health care provider if it is safe for you to drive.  · Wear a life jacket when swimming or spending time near water.  General instructions  · Take supplements and over-the-counter and prescription medicines only as told by your health care provider.  · Keep a sleep log that will help your doctor manage your condition. This may include information about:  ? What time you go to bed each night.  ? How often you wake up at night.  ? How many hours you sleep at night.  ? How often and for how long you nap during the day.  ? Any observations from others, such as leg movements during sleep,  sleep walking, or snoring.  · Keep all follow-up visits as told by your health care provider. This is important.  Contact a health care provider if:  · You have new symptoms.  · Your symptoms get worse.  Get help right away if:  · You have serious thoughts about hurting yourself or someone else.  If you ever feel like you may hurt yourself or others, or have thoughts about taking your own life, get help right away. You can go to your nearest emergency department or call:  · Your local emergency services (911 in the [place]  · A suicide crisis helpline, such as the National Suicide Prevention Lifeline at 1-773.201.2039. This is open 24 hours a day.  Summary  · Hypersomnia refers to a condition in which you feel very tired during the day even though you get plenty of sleep at night.  · A person with this condition may take naps during the day and may find it very difficult to wake up from sleep.  · Hypersomnia may affect a person's ability to think, concentrate, drive, or remember things.  · Treatment, such as following a regular sleep routine and making some lifestyle changes, can help you manage your condition.  This information is not intended to replace advice given to you by your health care provider. Make sure you discuss any questions you have with your health care provider.  Document Released: 12/08/2003 Document Revised: 12/20/2018 Document Reviewed: 12/20/2018  [employer] Interactive Patient Education © 2019 [employer]

## 2020-02-05 NOTE — TELEPHONE ENCOUNTER
CALLED PATIENT AND ADVISED SCRIPT FOR ADDERALL HAS BEEN E-SCRIBED TO [facility] IN [place] FOR DEC AND SHE WILL NEED TO  SCRIPT FOR January  PATIENT VERBALIZED UNDERSTANDING  12/14/18 TRC  
Her last [facility] report was on 10/15/2018.  We'll approve refill of her medications.  Alberto Marquez MD [facility]  Sleep Medicine  Pulmonary and Critical Care Medicine  
REFILL REQUEST     ADDERALL 20 MG     LOV 10/15/18  NOV 01/15/19     PLEASE ADVISE  
05-Feb-2020 16:32

## 2020-03-19 DIAGNOSIS — G47.10 HYPERSOMNIA: ICD-10-CM

## 2020-03-19 NOTE — TELEPHONE ENCOUNTER
PT REQUEST ADDERAL 30 MG 2X DAILY REFILL AND SEND TO [facility] IN [facility] [phone]

## 2020-03-20 RX ORDER — DEXTROAMPHETAMINE SACCHARATE, AMPHETAMINE ASPARTATE, DEXTROAMPHETAMINE SULFATE AND AMPHETAMINE SULFATE 7.5; 7.5; 7.5; 7.5 MG/1; MG/1; MG/1; MG/1
30 TABLET ORAL 2 TIMES DAILY
Qty: 60 TABLET | Refills: 0 | Status: SHIPPED | OUTPATIENT
Start: 2020-03-20 | End: 2020-05-08 | Stop reason: SDUPTHER

## 2020-03-20 NOTE — TELEPHONE ENCOUNTER
Roney report was checked in January.  We will refill her prescription  Alberto Marquez MD [facility]  Sleep Medicine  Pulmonary and Critical Care Medicine

## 2020-05-08 DIAGNOSIS — G47.10 HYPERSOMNIA: ICD-10-CM

## 2020-05-08 RX ORDER — DEXTROAMPHETAMINE SACCHARATE, AMPHETAMINE ASPARTATE, DEXTROAMPHETAMINE SULFATE AND AMPHETAMINE SULFATE 7.5; 7.5; 7.5; 7.5 MG/1; MG/1; MG/1; MG/1
30 TABLET ORAL 2 TIMES DAILY
Qty: 60 TABLET | Refills: 0 | Status: SHIPPED | OUTPATIENT
Start: 2020-05-08 | End: 2020-06-22 | Stop reason: SDUPTHER

## 2020-05-08 NOTE — TELEPHONE ENCOUNTER
Roney report reviewed and is #[id number].  We will refill her prescription  Alberto Marquez MD [facility]  Sleep Medicine  Pulmonary and Critical Care Medicine

## 2020-06-22 ENCOUNTER — TELEMEDICINE (OUTPATIENT)
Dept: SLEEP MEDICINE | Facility: HOSPITAL | Age: 40
End: 2020-06-22

## 2020-06-22 DIAGNOSIS — G47.10 HYPERSOMNIA: Primary | ICD-10-CM

## 2020-06-22 DIAGNOSIS — R06.83 SNORING: ICD-10-CM

## 2020-06-22 DIAGNOSIS — G89.4 CHRONIC PAIN SYNDROME: ICD-10-CM

## 2020-06-22 PROCEDURE — 99213 OFFICE O/P EST LOW 20 MIN: CPT | Performed by: INTERNAL MEDICINE

## 2020-06-22 RX ORDER — DEXTROAMPHETAMINE SACCHARATE, AMPHETAMINE ASPARTATE, DEXTROAMPHETAMINE SULFATE AND AMPHETAMINE SULFATE 7.5; 7.5; 7.5; 7.5 MG/1; MG/1; MG/1; MG/1
30 TABLET ORAL 2 TIMES DAILY
Qty: 60 TABLET | Refills: 0 | Status: SHIPPED | OUTPATIENT
Start: 2020-06-22 | End: 2020-07-23 | Stop reason: SDUPTHER

## 2020-06-22 NOTE — PROGRESS NOTES
Subjective   Shelby Mcdermott is a 40 y.o. female is here today for follow-up.  Patient is followed here with hypersomnia.  Her primary care physician is Dr. Villarreal. You have chosen to receive care through a telehealth visit.  Do you consent to use a video/audio connection for your medical care today? Yes    History of Present Illness  Patient was last seen his clinic January 10, 2020.  She has hypersomnia and has been on Adderall to help her stay more alert during the day.  She says it is working fairly well.  She not having to take it every day now since she is no longer works she denies any problems sleeping at night.  She denies any other new health issues.  Past Medical History:   Diagnosis Date   • Diabetes mellitus (CMS/HCC)    • Disease of thyroid gland    • Hypertension        Past Surgical History:   Procedure Laterality Date   • HYSTERECTOMY             Current Outpatient Medications:   •  amphetamine-dextroamphetamine (ADDERALL) 30 MG tablet, Take 1 tablet by mouth 2 (Two) Times a Day for 30 days., Disp: 60 tablet, Rfl: 0  •  citalopram (CeleXA) 20 MG tablet, , Disp: , Rfl:   •  estradiol (ESTRACE) 0.5 MG tablet, Take 0.5 mg by mouth Daily., Disp: , Rfl: 2  •  ibuprofen (ADVIL,MOTRIN) 800 MG tablet, , Disp: , Rfl:   •  levothyroxine (SYNTHROID, LEVOTHROID) 25 MCG tablet, , Disp: , Rfl:   •  lisinopril (PRINIVIL,ZESTRIL) 10 MG tablet, , Disp: , Rfl:   •  metFORMIN XR (GLUCOPHAGE-XR) 500 MG 24 hr tablet, , Disp: , Rfl:   •  omeprazole (PriLOSEC) 40 MG capsule, , Disp: , Rfl:   •  rosuvastatin (CRESTOR) 20 MG tablet, , Disp: , Rfl:   •  SUBOXONE 8-2 MG film film, , Disp: , Rfl:   •  SUMAtriptan (IMITREX) 100 MG tablet, , Disp: , Rfl:     Allergies   Allergen Reactions   • Sulforcin [Resorcinol-Sulfur] Hives   • Codeine Rash and GI Intolerance       The following portions of the patient's history were reviewed and updated as appropriate: allergies, current medications and problem list.    Review of Systems    Constitutional: Positive for fatigue.   HENT: Positive for congestion and postnasal drip.    Eyes: Negative.    Respiratory: Negative.    Cardiovascular: Negative.    Gastrointestinal: Negative.    Endocrine: Positive for cold intolerance and polydipsia.   Genitourinary: Negative.    Musculoskeletal: Negative.    Skin: Negative.    Allergic/Immunologic: Positive for environmental allergies.   Neurological: Negative.    Hematological: Negative.    Psychiatric/Behavioral: Negative.    Mohnton score is 14/24    Objective     There were no vitals taken for this visit.    Physical Exam  Patient appears awake and alert.  She does not appear to be in respiratory distress.  Her stated weight is 196 pounds.  Her height 5 feet 7 inches this gives her a body mass index of 29.5.    Assessment/Plan   Diagnoses and all orders for this visit:    Hypersomnia  -     amphetamine-dextroamphetamine (ADDERALL) 30 MG tablet; Take 1 tablet by mouth 2 (Two) Times a Day for 30 days.    Snoring    Chronic pain syndrome    Patient seems to be doing fairly well with her current dose of Adderall.  Suggested she continue to try to take vacations for medication.  We will will renew her prescription.  Roney report was reviewed on May 8.  We will plan to see her back in 6 months.  She is to contact us earlier symptoms worsen.    Total time: 15 minutes exclusive of procedures.             Alberto Marquez MD [facility]  Sleep Medicine  Pulmonary and Critical Care Medicine      06/22/20  7:33 PM

## 2020-07-23 DIAGNOSIS — G47.10 HYPERSOMNIA: ICD-10-CM

## 2020-07-24 RX ORDER — DEXTROAMPHETAMINE SACCHARATE, AMPHETAMINE ASPARTATE, DEXTROAMPHETAMINE SULFATE AND AMPHETAMINE SULFATE 7.5; 7.5; 7.5; 7.5 MG/1; MG/1; MG/1; MG/1
30 TABLET ORAL 2 TIMES DAILY
Qty: 60 TABLET | Refills: 0 | Status: SHIPPED | OUTPATIENT
Start: 2020-07-24 | End: 2020-08-20 | Stop reason: SDUPTHER

## 2020-07-24 NOTE — TELEPHONE ENCOUNTER
Roney report reviewed on 5/ 8.  We will refill her prescription.  Alberto Marquez MD [facility]  Sleep Medicine  Pulmonary and Critical Care Medicine

## 2020-08-20 DIAGNOSIS — G47.10 HYPERSOMNIA: ICD-10-CM

## 2020-08-24 RX ORDER — DEXTROAMPHETAMINE SACCHARATE, AMPHETAMINE ASPARTATE, DEXTROAMPHETAMINE SULFATE AND AMPHETAMINE SULFATE 7.5; 7.5; 7.5; 7.5 MG/1; MG/1; MG/1; MG/1
30 TABLET ORAL 2 TIMES DAILY
Qty: 60 TABLET | Refills: 0 | Status: SHIPPED | OUTPATIENT
Start: 2020-08-24 | End: 2020-09-23 | Stop reason: SDUPTHER

## 2020-08-24 NOTE — TELEPHONE ENCOUNTER
Roney report reviewed and is #[id number] we will refill her prescription  Alberto Marquez MD [facility]  Sleep Medicine  Pulmonary and Critical Care Medicine

## 2020-09-23 DIAGNOSIS — G47.10 HYPERSOMNIA: ICD-10-CM

## 2020-09-30 RX ORDER — DEXTROAMPHETAMINE SACCHARATE, AMPHETAMINE ASPARTATE, DEXTROAMPHETAMINE SULFATE AND AMPHETAMINE SULFATE 7.5; 7.5; 7.5; 7.5 MG/1; MG/1; MG/1; MG/1
30 TABLET ORAL 2 TIMES DAILY
Qty: 60 TABLET | Refills: 0 | Status: SHIPPED | OUTPATIENT
Start: 2020-09-30 | End: 2020-10-20 | Stop reason: SDUPTHER

## 2020-09-30 NOTE — TELEPHONE ENCOUNTER
Roney report reviewed August 24.  We will refill her prescription  Alberto Marquez MD [facility]  Sleep Medicine  Pulmonary and Critical Care Medicine

## 2020-10-20 DIAGNOSIS — G47.10 HYPERSOMNIA: ICD-10-CM

## 2020-10-20 NOTE — TELEPHONE ENCOUNTER
PATIENT CALLED TO REQUEST A REFILL ON HER ADDERALL 30 MG MEDICATION AND SHE WOULD LIKE FOR THIS MEDICATION TO BE SENT TO THE [facility] PHARM THAT IS IN HER CHART.    PATIENT HAS A SCHEDULED APPT ON 12/23/2020 WITH DR BLACK.    PT CAN BE REACHED [phone].

## 2020-10-23 RX ORDER — DEXTROAMPHETAMINE SACCHARATE, AMPHETAMINE ASPARTATE, DEXTROAMPHETAMINE SULFATE AND AMPHETAMINE SULFATE 7.5; 7.5; 7.5; 7.5 MG/1; MG/1; MG/1; MG/1
30 TABLET ORAL 2 TIMES DAILY
Qty: 60 TABLET | Refills: 0 | Status: SHIPPED | OUTPATIENT
Start: 2020-10-23 | End: 2020-11-30 | Stop reason: SDUPTHER

## 2020-10-23 NOTE — TELEPHONE ENCOUNTER
Roney report reviewed August 24.  We will refill her prescription  Alberto Marquez MD [facility]  Sleep Medicine  Pulmonary and Critical Care Medicine

## 2020-11-30 DIAGNOSIS — G47.10 HYPERSOMNIA: ICD-10-CM

## 2020-11-30 NOTE — PATIENT INSTRUCTIONS
Hypersomnia  Hypersomnia is a condition in which a person feels very tired during the day even though he or she gets plenty of sleep at night. A person with this condition may take naps during the day and may find it very difficult to wake up from sleep. Hypersomnia may affect a person's ability to think, concentrate, drive, or remember things.  What are the causes?  The cause of this condition may not be known. Possible causes include:  · Certain medicines.  · Sleep disorders, such as narcolepsy and sleep apnea.  · Injury to the head, brain, or spinal cord.  · Drug or alcohol use.  · Gastroesophageal reflux disease (GERD).  · Tumors.  · Certain medical conditions, such as depression, diabetes, or an underactive thyroid gland (hypothyroidism).  What are the signs or symptoms?  The main symptoms of hypersomnia include:  · Feeling very tired throughout the day, regardless of how much sleep you got the night before.  · Having trouble waking up. Others may find it difficult to wake you up when you are sleeping.  · Sleeping for longer and longer periods at a time.  · Taking naps throughout the day.  Other symptoms may include:  · Feeling restless, anxious, or annoyed.  · Lacking energy.  · Having trouble with:  ? Remembering.  ? Speaking.  ? Thinking.  · Loss of appetite.  · Seeing, hearing, tasting, smelling, or feeling things that are not real (hallucinations).  How is this diagnosed?  This condition may be diagnosed based on:  · Your symptoms and medical history.  · Your sleeping habits. Your health care provider may ask you to write down your sleeping habits in a daily sleep log, along with any symptoms you have.  · A series of tests that are done while you sleep (sleep study or polysomnogram).  · A test that measures how quickly you can fall asleep during the day (daytime nap study or multiple sleep latency test).  How is this treated?  Treatment can help you manage your condition. Treatment may  include:  · Following a regular sleep routine.  · Lifestyle changes, such as changing your eating habits, getting regular exercise, and avoiding alcohol or caffeinated beverages.  · Taking medicines to make you more alert (stimulants) during the day.  · Treating any underlying medical causes of hypersomnia.  Follow these instructions at home:  Sleep routine    · Schedule the same bedtime and wake-up time each day.  · Practice a relaxing bedtime routine. This may include reading, meditation, deep breathing, or taking a warm bath before going to sleep.  · Get regular exercise each day. Avoid strenuous exercise in the evening hours.  · Keep your sleep environment at a cooler temperature, darkened, and quiet.  · Sleep with pillows and a mattress that are comfortable and supportive.  · Schedule short 20-minute naps for when you feel sleepiest during the day.  · Talk with your employer or teachers about your hypersomnia. If possible, adjust your schedule so that:  ? You have a regular daytime work schedule.  ? You can take a scheduled nap during the day.  ? You do not have to work or be active at night.  · Do not eat a heavy meal for a few hours before bedtime. Eat your meals at about the same times every day.  · Avoid drinking alcohol or caffeinated beverages.  Safety    · Do not drive or use heavy machinery if you are sleepy. Ask your health care provider if it is safe for you to drive.  · Wear a life jacket when swimming or spending time near water.  General instructions  · Take supplements and over-the-counter and prescription medicines only as told by your health care provider.  · Keep a sleep log that will help your doctor manage your condition. This may include information about:  ? What time you go to bed each night.  ? How often you wake up at night.  ? How many hours you sleep at night.  ? How often and for how long you nap during the day.  ? Any observations from others, such as leg movements during sleep,  sleep walking, or snoring.  · Keep all follow-up visits as told by your health care provider. This is important.  Contact a health care provider if:  · You have new symptoms.  · Your symptoms get worse.  Get help right away if:  · You have serious thoughts about hurting yourself or someone else.  If you ever feel like you may hurt yourself or others, or have thoughts about taking your own life, get help right away. You can go to your nearest emergency department or call:  · Your local emergency services (911 in the [place]  · A suicide crisis helpline, such as the National Suicide Prevention Lifeline at 1-848.431.7789. This is open 24 hours a day.  Summary  · Hypersomnia refers to a condition in which you feel very tired during the day even though you get plenty of sleep at night.  · A person with this condition may take naps during the day and may find it very difficult to wake up from sleep.  · Hypersomnia may affect a person's ability to think, concentrate, drive, or remember things.  · Treatment, such as following a regular sleep routine and making some lifestyle changes, can help you manage your condition.  This information is not intended to replace advice given to you by your health care provider. Make sure you discuss any questions you have with your health care provider.  Document Released: 12/08/2003 Document Revised: 12/20/2018 Document Reviewed: 12/20/2018  Elsevier Patient Education © 2020 [employer] Inc.     Note Text (......Xxx Chief Complaint.): This diagnosis correlates with the Detail Level: Zone Other (Free Text): Patient will speak to her oncologist before starting terbinafine as it can reduce the effectiveness of her tamoxifen Other (Free Text): Patient will begin ketoconazole cream and terbinafine.

## 2020-12-01 DIAGNOSIS — G47.10 HYPERSOMNIA: ICD-10-CM

## 2020-12-03 RX ORDER — DEXTROAMPHETAMINE SACCHARATE, AMPHETAMINE ASPARTATE, DEXTROAMPHETAMINE SULFATE AND AMPHETAMINE SULFATE 7.5; 7.5; 7.5; 7.5 MG/1; MG/1; MG/1; MG/1
TABLET ORAL
Qty: 60 TABLET | Refills: 0 | OUTPATIENT
Start: 2020-12-03

## 2020-12-03 RX ORDER — DEXTROAMPHETAMINE SACCHARATE, AMPHETAMINE ASPARTATE, DEXTROAMPHETAMINE SULFATE AND AMPHETAMINE SULFATE 7.5; 7.5; 7.5; 7.5 MG/1; MG/1; MG/1; MG/1
30 TABLET ORAL 2 TIMES DAILY
Qty: 60 TABLET | Refills: 0 | Status: SHIPPED | OUTPATIENT
Start: 2020-12-03 | End: 2020-12-23 | Stop reason: SDUPTHER

## 2020-12-03 NOTE — TELEPHONE ENCOUNTER
Roney report reviewed and is #[id number].  We will refill her prescription  Alberto Marquez MD [facility]  Sleep Medicine  Pulmonary and Critical Care Medicine

## 2020-12-23 ENCOUNTER — TELEMEDICINE (OUTPATIENT)
Dept: SLEEP MEDICINE | Facility: HOSPITAL | Age: 40
End: 2020-12-23

## 2020-12-23 DIAGNOSIS — G47.10 HYPERSOMNIA: Primary | ICD-10-CM

## 2020-12-23 PROCEDURE — 99213 OFFICE O/P EST LOW 20 MIN: CPT | Performed by: INTERNAL MEDICINE

## 2020-12-23 RX ORDER — DEXTROAMPHETAMINE SACCHARATE, AMPHETAMINE ASPARTATE, DEXTROAMPHETAMINE SULFATE AND AMPHETAMINE SULFATE 7.5; 7.5; 7.5; 7.5 MG/1; MG/1; MG/1; MG/1
30 TABLET ORAL 2 TIMES DAILY
Qty: 60 TABLET | Refills: 0 | Status: SHIPPED | OUTPATIENT
Start: 2020-12-23 | End: 2021-01-29 | Stop reason: SDUPTHER

## 2020-12-23 NOTE — PROGRESS NOTES
Subjective   Shelby Mcdermott is a 40 y.o. female is here today for follow-up.  Patient is followed here with hypersomnia.  Her primary care physician is Dr. Villarreal.  You have chosen to receive care through a telehealth visit.  Do you consent to use a video/audio connection for your medical care today? Yes    History of Present Illness  Patient was last seen June 22, 2020.  She has hypersomnia and has been on Adderall.  She says she is doing well with her medications.  She is she is falling asleep well at night.  She says the Adderall seems to be working fairly well.  She denies any problems.  She still sometimes gets quite sleepy driving and has to take a nap.  She denies any other changes in her health status.  Past Medical History:   Diagnosis Date   • Diabetes mellitus (CMS/HCC)    • Disease of thyroid gland    • Hypertension        Past Surgical History:   Procedure Laterality Date   • HYSTERECTOMY             Current Outpatient Medications:   •  amphetamine-dextroamphetamine (ADDERALL) 30 MG tablet, Take 1 tablet by mouth 2 (Two) Times a Day for 30 days., Disp: 60 tablet, Rfl: 0  •  citalopram (CeleXA) 20 MG tablet, , Disp: , Rfl:   •  estradiol (CLIMARA) 0.05 MG/24HR patch, Place 1 patch on the skin as directed by provider Every 7 (Seven) Days., Disp: , Rfl:   •  ibuprofen (ADVIL,MOTRIN) 800 MG tablet, , Disp: , Rfl:   •  levothyroxine (SYNTHROID, LEVOTHROID) 25 MCG tablet, , Disp: , Rfl:   •  lisinopril (PRINIVIL,ZESTRIL) 10 MG tablet, , Disp: , Rfl:   •  metFORMIN XR (GLUCOPHAGE-XR) 500 MG 24 hr tablet, , Disp: , Rfl:   •  omeprazole (PriLOSEC) 40 MG capsule, , Disp: , Rfl:   •  rosuvastatin (CRESTOR) 20 MG tablet, , Disp: , Rfl:   •  SUBOXONE 8-2 MG film film, , Disp: , Rfl:   •  SUMAtriptan (IMITREX) 100 MG tablet, , Disp: , Rfl:     Allergies   Allergen Reactions   • Sulforcin [Resorcinol-Sulfur] Hives   • Sulfa Antibiotics Hives   • Codeine Rash and GI Intolerance       The following portions of the  patient's history were reviewed and updated as appropriate: allergies, current medications and problem list.    Review of Systems   Constitutional: Positive for fatigue.   HENT: Positive for congestion and postnasal drip.    Eyes: Negative.    Respiratory: Negative.    Cardiovascular: Negative.    Gastrointestinal: Negative.    Endocrine: Positive for cold intolerance and polydipsia.   Genitourinary: Negative.    Skin: Negative.    Allergic/Immunologic: Positive for environmental allergies.   Neurological: Negative.    Hematological: Negative.    Psychiatric/Behavioral: Negative.    Hines score is 14/24    Objective     There were no vitals taken for this visit.    Physical Exam  Patient appears awake and alert.  Not appear to be in acute respiratory distress.  Her stated weight is 200 pounds.  Her height is 5 feet 7 inches.  Her body mass index is 30.    Assessment/Plan   Diagnoses and all orders for this visit:    1. Hypersomnia (Primary)  -     amphetamine-dextroamphetamine (ADDERALL) 30 MG tablet; Take 1 tablet by mouth 2 (Two) Times a Day for 30 days.  Dispense: 60 tablet; Refill: 0    Patient seems to be doing very well with the current dosage of Adderall.  We will refill her prescription.  Roney report is reviewed.  She is encouraged to achieve ideal body weight.  She is encouraged to avoid alcohol and sedatives close to bedtime.  She is to practice excellent sleep hygiene.  We will plan to see her back again in 6 months.  She is to contact us when she needs refills.  She is to contact us if symptoms worsen.    Total time: 15 minutes exclusive of procedures.             Alberto Marquez MD [facility]  Sleep Medicine  Pulmonary and Critical Care Medicine      12/23/20  16:51 EST

## 2020-12-23 NOTE — PATIENT INSTRUCTIONS
Hypersomnia  Hypersomnia is a condition in which a person feels very tired during the day even though he or she gets plenty of sleep at night. A person with this condition may take naps during the day and may find it very difficult to wake up from sleep. Hypersomnia may affect a person's ability to think, concentrate, drive, or remember things.  What are the causes?  The cause of this condition may not be known. Possible causes include:  · Certain medicines.  · Sleep disorders, such as narcolepsy and sleep apnea.  · Injury to the head, brain, or spinal cord.  · Drug or alcohol use.  · Gastroesophageal reflux disease (GERD).  · Tumors.  · Certain medical conditions, such as depression, diabetes, or an underactive thyroid gland (hypothyroidism).  What are the signs or symptoms?  The main symptoms of hypersomnia include:  · Feeling very tired throughout the day, regardless of how much sleep you got the night before.  · Having trouble waking up. Others may find it difficult to wake you up when you are sleeping.  · Sleeping for longer and longer periods at a time.  · Taking naps throughout the day.  Other symptoms may include:  · Feeling restless, anxious, or annoyed.  · Lacking energy.  · Having trouble with:  ? Remembering.  ? Speaking.  ? Thinking.  · Loss of appetite.  · Seeing, hearing, tasting, smelling, or feeling things that are not real (hallucinations).  How is this diagnosed?  This condition may be diagnosed based on:  · Your symptoms and medical history.  · Your sleeping habits. Your health care provider may ask you to write down your sleeping habits in a daily sleep log, along with any symptoms you have.  · A series of tests that are done while you sleep (sleep study or polysomnogram).  · A test that measures how quickly you can fall asleep during the day (daytime nap study or multiple sleep latency test).  How is this treated?  Treatment can help you manage your condition. Treatment may  include:  · Following a regular sleep routine.  · Lifestyle changes, such as changing your eating habits, getting regular exercise, and avoiding alcohol or caffeinated beverages.  · Taking medicines to make you more alert (stimulants) during the day.  · Treating any underlying medical causes of hypersomnia.  Follow these instructions at home:  Sleep routine    · Schedule the same bedtime and wake-up time each day.  · Practice a relaxing bedtime routine. This may include reading, meditation, deep breathing, or taking a warm bath before going to sleep.  · Get regular exercise each day. Avoid strenuous exercise in the evening hours.  · Keep your sleep environment at a cooler temperature, darkened, and quiet.  · Sleep with pillows and a mattress that are comfortable and supportive.  · Schedule short 20-minute naps for when you feel sleepiest during the day.  · Talk with your employer or teachers about your hypersomnia. If possible, adjust your schedule so that:  ? You have a regular daytime work schedule.  ? You can take a scheduled nap during the day.  ? You do not have to work or be active at night.  · Do not eat a heavy meal for a few hours before bedtime. Eat your meals at about the same times every day.  · Avoid drinking alcohol or caffeinated beverages.  Safety    · Do not drive or use heavy machinery if you are sleepy. Ask your health care provider if it is safe for you to drive.  · Wear a life jacket when swimming or spending time near water.  General instructions  · Take supplements and over-the-counter and prescription medicines only as told by your health care provider.  · Keep a sleep log that will help your doctor manage your condition. This may include information about:  ? What time you go to bed each night.  ? How often you wake up at night.  ? How many hours you sleep at night.  ? How often and for how long you nap during the day.  ? Any observations from others, such as leg movements during sleep,  sleep walking, or snoring.  · Keep all follow-up visits as told by your health care provider. This is important.  Contact a health care provider if:  · You have new symptoms.  · Your symptoms get worse.  Get help right away if:  · You have serious thoughts about hurting yourself or someone else.  If you ever feel like you may hurt yourself or others, or have thoughts about taking your own life, get help right away. You can go to your nearest emergency department or call:  · Your local emergency services (911 in the [place]  · A suicide crisis helpline, such as the National Suicide Prevention Lifeline at 1-148.597.4237. This is open 24 hours a day.  Summary  · Hypersomnia refers to a condition in which you feel very tired during the day even though you get plenty of sleep at night.  · A person with this condition may take naps during the day and may find it very difficult to wake up from sleep.  · Hypersomnia may affect a person's ability to think, concentrate, drive, or remember things.  · Treatment, such as following a regular sleep routine and making some lifestyle changes, can help you manage your condition.  This information is not intended to replace advice given to you by your health care provider. Make sure you discuss any questions you have with your health care provider.  Document Revised: 12/20/2018 Document Reviewed: 12/20/2018  Elsevier Patient Education © 2020 [employer]

## 2021-01-29 DIAGNOSIS — G47.10 HYPERSOMNIA: ICD-10-CM

## 2021-01-29 RX ORDER — DEXTROAMPHETAMINE SACCHARATE, AMPHETAMINE ASPARTATE, DEXTROAMPHETAMINE SULFATE AND AMPHETAMINE SULFATE 7.5; 7.5; 7.5; 7.5 MG/1; MG/1; MG/1; MG/1
30 TABLET ORAL 2 TIMES DAILY
Qty: 60 TABLET | Refills: 0 | Status: SHIPPED | OUTPATIENT
Start: 2021-01-29 | End: 2021-03-01 | Stop reason: SDUPTHER

## 2021-01-29 NOTE — TELEPHONE ENCOUNTER
Roney report reviewed.  We will refill her prescription  Alberto Marquez MD [facility]  Sleep Medicine  Pulmonary and Critical Care Medicine

## 2021-02-17 ENCOUNTER — OFFICE VISIT (OUTPATIENT)
Dept: OBSTETRICS AND GYNECOLOGY | Facility: CLINIC | Age: 41
End: 2021-02-17

## 2021-02-17 VITALS
BODY MASS INDEX: 32.96 KG/M2 | SYSTOLIC BLOOD PRESSURE: 132 MMHG | WEIGHT: 210 LBS | DIASTOLIC BLOOD PRESSURE: 84 MMHG | HEIGHT: 67 IN

## 2021-02-17 DIAGNOSIS — Z13.9 SCREENING FOR CONDITION: ICD-10-CM

## 2021-02-17 DIAGNOSIS — R23.2 HOT FLASHES: Primary | ICD-10-CM

## 2021-02-17 LAB
BILIRUB BLD-MCNC: NEGATIVE MG/DL
CLARITY, POC: CLEAR
COLOR UR: YELLOW
GLUCOSE UR STRIP-MCNC: NEGATIVE MG/DL
KETONES UR QL: NEGATIVE
LEUKOCYTE EST, POC: NEGATIVE
NITRITE UR-MCNC: NEGATIVE MG/ML
PH UR: 6 [PH] (ref 5–8)
PROT UR STRIP-MCNC: NEGATIVE MG/DL
RBC # UR STRIP: NEGATIVE /UL
SP GR UR: 1.03 (ref 1–1.03)
UROBILINOGEN UR QL: NORMAL

## 2021-02-17 PROCEDURE — 99203 OFFICE O/P NEW LOW 30 MIN: CPT | Performed by: OBSTETRICS & GYNECOLOGY

## 2021-02-17 RX ORDER — ESTRADIOL 0.07 MG/D
1 PATCH TRANSDERMAL WEEKLY
Qty: 4 PATCH | Refills: 11 | Status: SHIPPED | OUTPATIENT
Start: 2021-02-17 | End: 2022-01-13

## 2021-02-17 RX ORDER — ESTRADIOL 0.05 MG/D
1 PATCH TRANSDERMAL
COMMUNITY
Start: 2020-12-21 | End: 2021-02-17

## 2021-02-17 RX ORDER — IBUPROFEN 800 MG/1
800 TABLET ORAL EVERY 8 HOURS PRN
COMMUNITY
Start: 2020-09-15

## 2021-02-17 RX ORDER — ERENUMAB-AOOE 70 MG/ML
INJECTION SUBCUTANEOUS
COMMUNITY
Start: 2020-11-10 | End: 2022-08-03

## 2021-02-17 RX ORDER — BUPRENORPHINE HYDROCHLORIDE AND NALOXONE HYDROCHLORIDE DIHYDRATE 8; 2 MG/1; MG/1
TABLET SUBLINGUAL
COMMUNITY
Start: 2021-02-12

## 2021-02-17 RX ORDER — METFORMIN HYDROCHLORIDE 500 MG/1
TABLET, EXTENDED RELEASE ORAL
COMMUNITY
Start: 2020-11-04

## 2021-02-17 RX ORDER — BUSPIRONE HYDROCHLORIDE 10 MG/1
10 TABLET ORAL
COMMUNITY
Start: 2020-11-04 | End: 2022-08-03

## 2021-02-17 RX ORDER — OMEPRAZOLE 40 MG/1
40 CAPSULE, DELAYED RELEASE ORAL DAILY
COMMUNITY
Start: 2020-11-04

## 2021-02-17 RX ORDER — LEVOTHYROXINE SODIUM 0.03 MG/1
25 TABLET ORAL DAILY
COMMUNITY
Start: 2020-11-04

## 2021-02-17 RX ORDER — SUMATRIPTAN 20 MG/1
SPRAY NASAL
COMMUNITY
Start: 2020-12-07

## 2021-02-17 RX ORDER — CITALOPRAM 20 MG/1
TABLET ORAL
COMMUNITY
Start: 2021-02-12

## 2021-02-17 RX ORDER — LISINOPRIL 10 MG/1
10 TABLET ORAL DAILY
COMMUNITY
Start: 2020-11-04

## 2021-02-17 NOTE — PROGRESS NOTES
"      Shelby Mcdermott is a 40 y.o. patient who presents for follow up of   Chief Complaint   Patient presents with   • Hot Flashes        HPI 40-year-old new patient complaining of hot flashes.  She had a hysterectomy for endometriosis in her 20s.  She had a bilateral salpingo-oophorectomy because the endometriosis was so extensive.  She is on Climara patch but has hot flashes daily.  It is worse at work.  Sometimes the patch does not stick because she is so sweaty.  She has no tachycardia associated with it.  No family history of thyroid disorders.  This is a new patient with a new problem.    The following portions of the patient's history were reviewed and updated as appropriate: allergies, current medications and problem list.    Review of Systems   Constitutional: Negative for appetite change, fever and unexpected weight change.   HENT: Negative for congestion and sore throat.    Respiratory: Negative for cough and shortness of breath.    Cardiovascular: Negative for chest pain and palpitations.   Gastrointestinal: Negative for abdominal distention, abdominal pain, constipation, diarrhea, nausea and vomiting.   Endocrine: Positive for heat intolerance.   Genitourinary: Negative for dyspareunia, menstrual problem, pelvic pain and vaginal discharge.   Skin: Negative.    Neurological: Negative for dizziness and syncope.   Hematological: Negative.    Psychiatric/Behavioral: Negative for dysphoric mood and sleep disturbance. The patient is not nervous/anxious.        /84   Ht 170.2 cm (67\")   Wt 95.3 kg (210 lb)   BMI 32.89 kg/m²     Physical Exam  Vitals signs and nursing note reviewed.   Constitutional:       Appearance: She is well-developed.   HENT:      Head: Normocephalic and atraumatic.   Pulmonary:      Effort: Pulmonary effort is normal. No respiratory distress.   Abdominal:      General: There is no distension.      Palpations: Abdomen is soft. There is no mass.      Tenderness: There is no " abdominal tenderness. There is no guarding or rebound.   Musculoskeletal: Normal range of motion.   Skin:     General: Skin is warm and dry.   Neurological:      Mental Status: She is alert and oriented to person, place, and time.   Psychiatric:         Behavior: Behavior normal.         Thought Content: Thought content normal.         Judgment: Judgment normal.           Assessment/Plan    Diagnoses and all orders for this visit:    1. Hot flashes (Primary)  -     TSH Rfx On Abnormal To Free T4    2. Screening for condition  -     POC Urinalysis Dipstick    Other orders  -     estradiol (Climara) 0.075 MG/24HR patch; Place 1 patch on the skin as directed by provider 1 (One) Time Per Week.  Dispense: 4 patch; Refill: 11    Increase estradiol patch to reasonable dose.  Follow up via phone in about 6 to 8 weeks.    Return if symptoms worsen or fail to improve.      Toi Bauer MD  2/17/2021  14:31 EST

## 2021-02-18 LAB — TSH SERPL DL<=0.005 MIU/L-ACNC: 2.26 UIU/ML (ref 0.45–4.5)

## 2021-03-01 DIAGNOSIS — G47.10 HYPERSOMNIA: ICD-10-CM

## 2021-03-01 NOTE — TELEPHONE ENCOUNTER
Patient lvm stating that she would like to get a refill on her adderall 30 mg medication and she would like to have that sent to the [facility] pharmacy that is in her chart. Please advise

## 2021-03-05 RX ORDER — DEXTROAMPHETAMINE SACCHARATE, AMPHETAMINE ASPARTATE, DEXTROAMPHETAMINE SULFATE AND AMPHETAMINE SULFATE 7.5; 7.5; 7.5; 7.5 MG/1; MG/1; MG/1; MG/1
30 TABLET ORAL 2 TIMES DAILY
Qty: 60 TABLET | Refills: 0 | Status: SHIPPED | OUTPATIENT
Start: 2021-03-05 | End: 2021-03-31 | Stop reason: SDUPTHER

## 2021-03-05 NOTE — TELEPHONE ENCOUNTER
Roney report reviewed on January 29.  We will refill her prescription  Alberto Marquez MD [facility]  Sleep Medicine  Pulmonary and Critical Care Medicine

## 2021-03-31 DIAGNOSIS — G47.10 HYPERSOMNIA: ICD-10-CM

## 2021-04-01 RX ORDER — DEXTROAMPHETAMINE SACCHARATE, AMPHETAMINE ASPARTATE, DEXTROAMPHETAMINE SULFATE AND AMPHETAMINE SULFATE 7.5; 7.5; 7.5; 7.5 MG/1; MG/1; MG/1; MG/1
30 TABLET ORAL 2 TIMES DAILY
Qty: 60 TABLET | Refills: 0 | Status: SHIPPED | OUTPATIENT
Start: 2021-04-01 | End: 2021-05-03 | Stop reason: SDUPTHER

## 2021-04-01 NOTE — TELEPHONE ENCOUNTER
Roney report reviewed on January 29.  We will refill her prescription  Alberto Marquez MD [facility]  Sleep Medicine  Pulmonary and Critical Care Medicine

## 2021-05-03 DIAGNOSIS — G47.10 HYPERSOMNIA: ICD-10-CM

## 2021-05-04 RX ORDER — DEXTROAMPHETAMINE SACCHARATE, AMPHETAMINE ASPARTATE, DEXTROAMPHETAMINE SULFATE AND AMPHETAMINE SULFATE 7.5; 7.5; 7.5; 7.5 MG/1; MG/1; MG/1; MG/1
30 TABLET ORAL 2 TIMES DAILY
Qty: 60 TABLET | Refills: 0 | Status: SHIPPED | OUTPATIENT
Start: 2021-05-04 | End: 2021-06-02 | Stop reason: SDUPTHER

## 2021-05-04 NOTE — TELEPHONE ENCOUNTER
Roney report reviewed and is #[id number].  We will refill her prescription  Alberto Marquez MD [facility]  Sleep Medicine  Pulmonary and Critical Care Medicine

## 2021-06-02 DIAGNOSIS — G47.10 HYPERSOMNIA: ICD-10-CM

## 2021-06-03 RX ORDER — DEXTROAMPHETAMINE SACCHARATE, AMPHETAMINE ASPARTATE, DEXTROAMPHETAMINE SULFATE AND AMPHETAMINE SULFATE 7.5; 7.5; 7.5; 7.5 MG/1; MG/1; MG/1; MG/1
30 TABLET ORAL 2 TIMES DAILY
Qty: 60 TABLET | Refills: 0 | Status: SHIPPED | OUTPATIENT
Start: 2021-06-03 | End: 2021-07-08 | Stop reason: SDUPTHER

## 2021-06-03 NOTE — TELEPHONE ENCOUNTER
Roney report reviewed on May 4.  Will refill her prescription  Alberto Marquez MD [facility]  Sleep Medicine  Pulmonary and Critical Care Medicine

## 2021-07-08 ENCOUNTER — TELEMEDICINE (OUTPATIENT)
Dept: SLEEP MEDICINE | Facility: HOSPITAL | Age: 41
End: 2021-07-08

## 2021-07-08 DIAGNOSIS — G47.10 HYPERSOMNIA: Primary | ICD-10-CM

## 2021-07-08 PROCEDURE — 99213 OFFICE O/P EST LOW 20 MIN: CPT | Performed by: INTERNAL MEDICINE

## 2021-07-08 RX ORDER — DEXTROAMPHETAMINE SACCHARATE, AMPHETAMINE ASPARTATE, DEXTROAMPHETAMINE SULFATE AND AMPHETAMINE SULFATE 7.5; 7.5; 7.5; 7.5 MG/1; MG/1; MG/1; MG/1
30 TABLET ORAL 2 TIMES DAILY
Qty: 60 TABLET | Refills: 0 | Status: SHIPPED | OUTPATIENT
Start: 2021-07-08 | End: 2021-08-02 | Stop reason: SDUPTHER

## 2021-07-08 NOTE — PATIENT INSTRUCTIONS
Hypersomnia  Hypersomnia is a condition in which a person feels very tired during the day even though he or she gets plenty of sleep at night. A person with this condition may take naps during the day and may find it very difficult to wake up from sleep. Hypersomnia may affect a person's ability to think, concentrate, drive, or remember things.  What are the causes?  The cause of this condition may not be known. Possible causes include:  · Certain medicines.  · Sleep disorders, such as narcolepsy and sleep apnea.  · Injury to the head, brain, or spinal cord.  · Drug or alcohol use.  · Gastroesophageal reflux disease (GERD).  · Tumors.  · Certain medical conditions, such as depression, diabetes, or an underactive thyroid gland (hypothyroidism).  What are the signs or symptoms?  The main symptoms of hypersomnia include:  · Feeling very tired throughout the day, regardless of how much sleep you got the night before.  · Having trouble waking up. Others may find it difficult to wake you up when you are sleeping.  · Sleeping for longer and longer periods at a time.  · Taking naps throughout the day.  Other symptoms may include:  · Feeling restless, anxious, or annoyed.  · Lacking energy.  · Having trouble with:  ? Remembering.  ? Speaking.  ? Thinking.  · Loss of appetite.  · Seeing, hearing, tasting, smelling, or feeling things that are not real (hallucinations).  How is this diagnosed?  This condition may be diagnosed based on:  · Your symptoms and medical history.  · Your sleeping habits. Your health care provider may ask you to write down your sleeping habits in a daily sleep log, along with any symptoms you have.  · A series of tests that are done while you sleep (sleep study or polysomnogram).  · A test that measures how quickly you can fall asleep during the day (daytime nap study or multiple sleep latency test).  How is this treated?  Treatment can help you manage your condition. Treatment may  include:  · Following a regular sleep routine.  · Lifestyle changes, such as changing your eating habits, getting regular exercise, and avoiding alcohol or caffeinated beverages.  · Taking medicines to make you more alert (stimulants) during the day.  · Treating any underlying medical causes of hypersomnia.  Follow these instructions at home:  Sleep routine    · Schedule the same bedtime and wake-up time each day.  · Practice a relaxing bedtime routine. This may include reading, meditation, deep breathing, or taking a warm bath before going to sleep.  · Get regular exercise each day. Avoid strenuous exercise in the evening hours.  · Keep your sleep environment at a cooler temperature, darkened, and quiet.  · Sleep with pillows and a mattress that are comfortable and supportive.  · Schedule short 20-minute naps for when you feel sleepiest during the day.  · Talk with your employer or teachers about your hypersomnia. If possible, adjust your schedule so that:  ? You have a regular daytime work schedule.  ? You can take a scheduled nap during the day.  ? You do not have to work or be active at night.  · Do not eat a heavy meal for a few hours before bedtime. Eat your meals at about the same times every day.  · Avoid drinking alcohol or caffeinated beverages.  Safety    · Do not drive or use heavy machinery if you are sleepy. Ask your health care provider if it is safe for you to drive.  · Wear a life jacket when swimming or spending time near water.  General instructions  · Take supplements and over-the-counter and prescription medicines only as told by your health care provider.  · Keep a sleep log that will help your doctor manage your condition. This may include information about:  ? What time you go to bed each night.  ? How often you wake up at night.  ? How many hours you sleep at night.  ? How often and for how long you nap during the day.  ? Any observations from others, such as leg movements during sleep,  sleep walking, or snoring.  · Keep all follow-up visits as told by your health care provider. This is important.  Contact a health care provider if:  · You have new symptoms.  · Your symptoms get worse.  Get help right away if:  · You have serious thoughts about hurting yourself or someone else.  If you ever feel like you may hurt yourself or others, or have thoughts about taking your own life, get help right away. You can go to your nearest emergency department or call:  · Your local emergency services (911 in the [place]  · A suicide crisis helpline, such as the National Suicide Prevention Lifeline at 1-303.655.1575. This is open 24 hours a day.  Summary  · Hypersomnia refers to a condition in which you feel very tired during the day even though you get plenty of sleep at night.  · A person with this condition may take naps during the day and may find it very difficult to wake up from sleep.  · Hypersomnia may affect a person's ability to think, concentrate, drive, or remember things.  · Treatment, such as following a regular sleep routine and making some lifestyle changes, can help you manage your condition.  This information is not intended to replace advice given to you by your health care provider. Make sure you discuss any questions you have with your health care provider.  Document Revised: 12/20/2018 Document Reviewed: 12/20/2018  Elsevier Patient Education © 2021 Elsevier Inc.

## 2021-07-12 NOTE — PROGRESS NOTES
Subjective   Shelby Mcdermott is a 41 y.o. female is here today for follow-up.  She is seen in follow-up of hypersomnia.  Her primary care physician is Dr. Villarreal.  You have chosen to receive care through a telehealth visit.  Do you consent to use a video/audio connection for your medical care today? Yes    History of Present Illness  Patient was last seen Dec 23.  She has a history of hypersomnia and has been treated with Adderall.  She says she is doing fairly well.  She is still sleepy during the day but says she has been working a lot.  She sometimes naps during the day if she has a chance.  She thinks the medication though overall is working fairly well.  She is able to fall asleep okay at night.    She says she takes her Adderall in the morning and sometimes avoid taking second dose if she is not at work.    She has been having some problems with her hand sounds as though she is to have a nerve conduction study.  Past Medical History:   Diagnosis Date   • Diabetes mellitus (CMS/HCC)    • Disease of thyroid gland    • Endometriosis     s/p FAB/BSO   • Hypertension        Past Surgical History:   Procedure Laterality Date   • HYSTERECTOMY             Current Outpatient Medications:   •  Aimovig 70 MG/ML prefilled syringe, , Disp: , Rfl:   •  amphetamine-dextroamphetamine (ADDERALL) 30 MG tablet, Take 1 tablet by mouth 2 (Two) Times a Day for 30 days., Disp: 60 tablet, Rfl: 0  •  buprenorphine-naloxone (SUBOXONE) 8-2 MG per SL tablet, , Disp: , Rfl:   •  busPIRone (BUSPAR) 10 MG tablet, Take 10 mg by mouth., Disp: , Rfl:   •  citalopram (CeleXA) 20 MG tablet, TAKE 1 TABLET BY MOUTH DAILY. (MAKE APPOINTMENT FOR FUTURE FILLS), Disp: , Rfl:   •  estradiol (Climara) 0.075 MG/24HR patch, Place 1 patch on the skin as directed by provider 1 (One) Time Per Week., Disp: 4 patch, Rfl: 11  •  ibuprofen (ADVIL,MOTRIN) 800 MG tablet, Take 800 mg by mouth Every 8 (Eight) Hours As Needed., Disp: , Rfl:   •  levothyroxine  (SYNTHROID, LEVOTHROID) 25 MCG tablet, Take 25 mcg by mouth Daily., Disp: , Rfl:   •  lisinopril (PRINIVIL,ZESTRIL) 10 MG tablet, Take 10 mg by mouth Daily., Disp: , Rfl:   •  metFORMIN ER (GLUCOPHAGE-XR) 500 MG 24 hr tablet, TAKE 2 TABLETS BY MOUTH ONCE DAILY WITH FOOD, Disp: , Rfl:   •  omeprazole (priLOSEC) 40 MG capsule, Take 40 mg by mouth Daily., Disp: , Rfl:   •  rosuvastatin (CRESTOR) 20 MG tablet, , Disp: , Rfl:   •  SUMAtriptan (IMITREX) 100 MG tablet, , Disp: , Rfl:   •  SUMAtriptan (IMITREX) 20 MG/ACT nasal spray, , Disp: , Rfl:     Allergies   Allergen Reactions   • Sulforcin [Resorcinol-Sulfur] Hives   • Sulfa Antibiotics Hives   • Codeine Rash and GI Intolerance       The following portions of the patient's history were reviewed and updated as appropriate: allergies, current medications and problem list.    Review of Systems   Constitutional: Positive for fatigue.   HENT: Positive for congestion and postnasal drip.    Eyes: Negative.    Respiratory: Negative.    Cardiovascular: Negative.    Gastrointestinal: Negative.    Endocrine: Positive for cold intolerance and polydipsia.   Genitourinary: Negative.    Musculoskeletal: Negative.    Skin: Negative.    Allergic/Immunologic: Positive for environmental allergies.   Neurological: Negative.    Hematological: Negative.    Psychiatric/Behavioral: Negative.    [place] score is 14/24    Objective     There were no vitals taken for this visit.    Physical Exam  Patient appears to be awake and alert.  She does not appear to be in acute respiratory distress.  Her stated weight is 200 pounds.  Her height 5 feet 7 inches.  Body mass index is 30.    Assessment/Plan   Diagnoses and all orders for this visit:    1. Hypersomnia (Primary)  -     amphetamine-dextroamphetamine (ADDERALL) 30 MG tablet; Take 1 tablet by mouth 2 (Two) Times a Day for 30 days.  Dispense: 60 tablet; Refill: 0    Patient seems to be doing fairly well with her current medication regimen.  We  will continue her regimen.  Her Roney report is reviewed.  We will plan to see her back in 6 months.  She is to contact us when she needs a refill on her medications.  She is encouraged to practice excellent sleep hygiene.  She is encouraged to achieve ideal body weight.  She is encouraged to avoid alcohol or sedatives close to bedtime.    Total time: 25 minutes exclusive of procedures.             Alberto Marquez MD [facility]  Sleep Medicine  Pulmonary and Critical Care Medicine      07/11/21  22:45 EDT

## 2021-08-02 DIAGNOSIS — G47.10 HYPERSOMNIA: ICD-10-CM

## 2021-08-02 NOTE — TELEPHONE ENCOUNTER
Patient called in to request an Adderall refill. [facility] is confirmed. [phone].

## 2021-08-03 RX ORDER — DEXTROAMPHETAMINE SACCHARATE, AMPHETAMINE ASPARTATE, DEXTROAMPHETAMINE SULFATE AND AMPHETAMINE SULFATE 7.5; 7.5; 7.5; 7.5 MG/1; MG/1; MG/1; MG/1
30 TABLET ORAL 2 TIMES DAILY
Qty: 60 TABLET | Refills: 0 | Status: SHIPPED | OUTPATIENT
Start: 2021-08-03 | End: 2021-09-09 | Stop reason: SDUPTHER

## 2021-08-03 NOTE — TELEPHONE ENCOUNTER
Roney report reviewed last month.  We will refill her prescription  Alberto Maqruez MD [facility]  Sleep Medicine  Pulmonary and Critical Care Medicine

## 2021-09-09 DIAGNOSIS — G47.10 HYPERSOMNIA: ICD-10-CM

## 2021-09-09 NOTE — TELEPHONE ENCOUNTER
PT CALLED REQUESTING A REFILL FOR ADDERALL PRESCRIPTION. PT STATES THAT THE PRESCRIPTION IS ALMOST OUT.

## 2021-09-10 RX ORDER — DEXTROAMPHETAMINE SACCHARATE, AMPHETAMINE ASPARTATE, DEXTROAMPHETAMINE SULFATE AND AMPHETAMINE SULFATE 7.5; 7.5; 7.5; 7.5 MG/1; MG/1; MG/1; MG/1
30 TABLET ORAL 2 TIMES DAILY
Qty: 60 TABLET | Refills: 0 | Status: SHIPPED | OUTPATIENT
Start: 2021-09-10 | End: 2021-10-08 | Stop reason: SDUPTHER

## 2021-09-10 NOTE — TELEPHONE ENCOUNTER
Roney report reviewed July 8.  We will refill her prescription  Alberto Marquez MD [facility]  Sleep Medicine  Pulmonary and Critical Care Medicine

## 2021-10-05 ENCOUNTER — TELEPHONE (OUTPATIENT)
Dept: SLEEP MEDICINE | Facility: HOSPITAL | Age: 41
End: 2021-10-05

## 2021-10-05 NOTE — TELEPHONE ENCOUNTER
Patient called, to request a refill for amphetamine-dextroamphetamine (ADDERALL) 30 MG tablet Patient has a few days of supply remainng. Please send to [facility] pharmacy

## 2021-10-08 DIAGNOSIS — G47.10 HYPERSOMNIA: Primary | ICD-10-CM

## 2021-10-08 RX ORDER — DEXTROAMPHETAMINE SACCHARATE, AMPHETAMINE ASPARTATE, DEXTROAMPHETAMINE SULFATE AND AMPHETAMINE SULFATE 7.5; 7.5; 7.5; 7.5 MG/1; MG/1; MG/1; MG/1
30 TABLET ORAL 2 TIMES DAILY
Qty: 60 TABLET | Refills: 0 | Status: SHIPPED | OUTPATIENT
Start: 2021-10-08 | End: 2021-11-15 | Stop reason: SDUPTHER

## 2021-10-08 NOTE — PROGRESS NOTES
Patient requests refill on her Adderall.  Roney report is reviewed.  We will refill her prescription  Alberto Marquez MD [facility]  Sleep Medicine  Pulmonary and Critical Care Medicine

## 2021-11-12 DIAGNOSIS — G47.10 HYPERSOMNIA: ICD-10-CM

## 2021-11-12 RX ORDER — DEXTROAMPHETAMINE SACCHARATE, AMPHETAMINE ASPARTATE, DEXTROAMPHETAMINE SULFATE AND AMPHETAMINE SULFATE 7.5; 7.5; 7.5; 7.5 MG/1; MG/1; MG/1; MG/1
30 TABLET ORAL 2 TIMES DAILY
Qty: 60 TABLET | Refills: 0 | Status: CANCELLED | OUTPATIENT
Start: 2021-11-12 | End: 2021-12-12

## 2021-11-15 DIAGNOSIS — G47.10 HYPERSOMNIA: Primary | ICD-10-CM

## 2021-11-15 RX ORDER — DEXTROAMPHETAMINE SACCHARATE, AMPHETAMINE ASPARTATE, DEXTROAMPHETAMINE SULFATE AND AMPHETAMINE SULFATE 7.5; 7.5; 7.5; 7.5 MG/1; MG/1; MG/1; MG/1
30 TABLET ORAL 2 TIMES DAILY
Qty: 60 TABLET | Refills: 0 | Status: SHIPPED | OUTPATIENT
Start: 2021-11-15 | End: 2021-12-21 | Stop reason: SDUPTHER

## 2021-11-15 NOTE — PROGRESS NOTES
Patient request refill on her Adderall.  Roney report is reviewed.  We will refill her prescription.  Alberto Marquez MD [facility]  Sleep Medicine  Pulmonary and Critical Care Medicine

## 2021-12-21 DIAGNOSIS — G47.10 HYPERSOMNIA: Primary | ICD-10-CM

## 2021-12-21 RX ORDER — DEXTROAMPHETAMINE SACCHARATE, AMPHETAMINE ASPARTATE, DEXTROAMPHETAMINE SULFATE AND AMPHETAMINE SULFATE 7.5; 7.5; 7.5; 7.5 MG/1; MG/1; MG/1; MG/1
30 TABLET ORAL 2 TIMES DAILY
Qty: 60 TABLET | Refills: 0 | Status: SHIPPED | OUTPATIENT
Start: 2021-12-21 | End: 2022-01-25 | Stop reason: SDUPTHER

## 2021-12-21 NOTE — PROGRESS NOTES
Patient requests refill on her Adderall.  Roney report is reviewed.  We will refill her prescription  Alberto Marquez MD [facility]  Sleep Medicine  Pulmonary and Critical Care Medicine

## 2022-01-11 ENCOUNTER — TELEMEDICINE (OUTPATIENT)
Dept: SLEEP MEDICINE | Facility: HOSPITAL | Age: 42
End: 2022-01-11

## 2022-01-11 DIAGNOSIS — G47.10 HYPERSOMNIA: Primary | ICD-10-CM

## 2022-01-11 DIAGNOSIS — E66.09 CLASS 1 OBESITY DUE TO EXCESS CALORIES WITHOUT SERIOUS COMORBIDITY WITH BODY MASS INDEX (BMI) OF 30.0 TO 30.9 IN ADULT: ICD-10-CM

## 2022-01-11 DIAGNOSIS — R06.83 SNORING: ICD-10-CM

## 2022-01-11 PROCEDURE — 99213 OFFICE O/P EST LOW 20 MIN: CPT | Performed by: INTERNAL MEDICINE

## 2022-01-11 NOTE — PATIENT INSTRUCTIONS
Hypersomnia  Hypersomnia is a condition in which a person feels very tired during the day even though he or she gets plenty of sleep at night. A person with this condition may take naps during the day and may find it very difficult to wake up from sleep. Hypersomnia may affect a person's ability to think, concentrate, drive, or remember things.  What are the causes?  The cause of this condition may not be known. Possible causes include:  · Certain medicines.  · Sleep disorders, such as narcolepsy and sleep apnea.  · Injury to the head, brain, or spinal cord.  · Drug or alcohol use.  · Gastroesophageal reflux disease (GERD).  · Tumors.  · Certain medical conditions, such as depression, diabetes, or an underactive thyroid gland (hypothyroidism).  What are the signs or symptoms?  The main symptoms of hypersomnia include:  · Feeling very tired throughout the day, regardless of how much sleep you got the night before.  · Having trouble waking up. Others may find it difficult to wake you up when you are sleeping.  · Sleeping for longer and longer periods at a time.  · Taking naps throughout the day.  Other symptoms may include:  · Feeling restless, anxious, or annoyed.  · Lacking energy.  · Having trouble with:  ? Remembering.  ? Speaking.  ? Thinking.  · Loss of appetite.  · Seeing, hearing, tasting, smelling, or feeling things that are not real (hallucinations).  How is this diagnosed?  This condition may be diagnosed based on:  · Your symptoms and medical history.  · Your sleeping habits. Your health care provider may ask you to write down your sleeping habits in a daily sleep log, along with any symptoms you have.  · A series of tests that are done while you sleep (sleep study or polysomnogram).  · A test that measures how quickly you can fall asleep during the day (daytime nap study or multiple sleep latency test).  How is this treated?  Treatment can help you manage your condition. Treatment may  include:  · Following a regular sleep routine.  · Lifestyle changes, such as changing your eating habits, getting regular exercise, and avoiding alcohol or caffeinated beverages.  · Taking medicines to make you more alert (stimulants) during the day.  · Treating any underlying medical causes of hypersomnia.  Follow these instructions at home:  Sleep routine    · Schedule the same bedtime and wake-up time each day.  · Practice a relaxing bedtime routine. This may include reading, meditation, deep breathing, or taking a warm bath before going to sleep.  · Get regular exercise each day. Avoid strenuous exercise in the evening hours.  · Keep your sleep environment at a cooler temperature, darkened, and quiet.  · Sleep with pillows and a mattress that are comfortable and supportive.  · Schedule short 20-minute naps for when you feel sleepiest during the day.  · Talk with your employer or teachers about your hypersomnia. If possible, adjust your schedule so that:  ? You have a regular daytime work schedule.  ? You can take a scheduled nap during the day.  ? You do not have to work or be active at night.  · Do not eat a heavy meal for a few hours before bedtime. Eat your meals at about the same times every day.  · Avoid drinking alcohol or caffeinated beverages.    Safety    · Do not drive or use heavy machinery if you are sleepy. Ask your health care provider if it is safe for you to drive.  · Wear a life jacket when swimming or spending time near water.    General instructions  · Take supplements and over-the-counter and prescription medicines only as told by your health care provider.  · Keep a sleep log that will help your doctor manage your condition. This may include information about:  ? What time you go to bed each night.  ? How often you wake up at night.  ? How many hours you sleep at night.  ? How often and for how long you nap during the day.  ? Any observations from others, such as leg movements during sleep,  sleep walking, or snoring.  · Keep all follow-up visits as told by your health care provider. This is important.  Contact a health care provider if:  · You have new symptoms.  · Your symptoms get worse.  Get help right away if:  · You have serious thoughts about hurting yourself or someone else.  If you ever feel like you may hurt yourself or others, or have thoughts about taking your own life, get help right away. You can go to your nearest emergency department or call:  · Your local emergency services (911 in the [place]  · A suicide crisis helpline, such as the National Suicide Prevention Lifeline at 1-962.971.7013. This is open 24 hours a day.  Summary  · Hypersomnia refers to a condition in which you feel very tired during the day even though you get plenty of sleep at night.  · A person with this condition may take naps during the day and may find it very difficult to wake up from sleep.  · Hypersomnia may affect a person's ability to think, concentrate, drive, or remember things.  · Treatment, such as following a regular sleep routine and making some lifestyle changes, can help you manage your condition.  This information is not intended to replace advice given to you by your health care provider. Make sure you discuss any questions you have with your health care provider.  Document Revised: 12/20/2018 Document Reviewed: 12/20/2018  Elsevier Patient Education © 2021 [employer]

## 2022-01-13 RX ORDER — ESTRADIOL 0.07 MG/D
1 PATCH TRANSDERMAL WEEKLY
Qty: 4 PATCH | Refills: 11 | Status: SHIPPED | OUTPATIENT
Start: 2022-01-13 | End: 2022-12-22

## 2022-01-25 DIAGNOSIS — G47.10 HYPERSOMNIA: Primary | ICD-10-CM

## 2022-01-25 RX ORDER — DEXTROAMPHETAMINE SACCHARATE, AMPHETAMINE ASPARTATE, DEXTROAMPHETAMINE SULFATE AND AMPHETAMINE SULFATE 7.5; 7.5; 7.5; 7.5 MG/1; MG/1; MG/1; MG/1
30 TABLET ORAL 2 TIMES DAILY
Qty: 60 TABLET | Refills: 0 | Status: SHIPPED | OUTPATIENT
Start: 2022-01-25 | End: 2022-02-23 | Stop reason: SDUPTHER

## 2022-01-25 NOTE — PROGRESS NOTES
Patient requests a refill on her Adderall.  Roney report is reviewed.  We will refill her prescription  Alberto Marquez MD [facility]  Sleep Medicine  Pulmonary and Critical Care Medicine

## 2022-01-26 NOTE — PROGRESS NOTES
Chief Complaint  Hypersomnia    Subjective         Shelby Mcdermott presents to [facility] for the evaluation of hypersomnia.  Her primary care physician is Dr. Villarreal. You have chosen to receive care through a telehealth visit.  Do you consent to use a video/audio connection for your medical care today? Yes  History of Present Illness  Patient was last seen in clinic July 8.  She has hypersomnia, hypertension, diabetes, and obesity.  She says that she is doing about the same.  She says her sleep habits are about the same.  She is staying awake during the day with physicians medication.  She does not have a problem falling asleep at night.  She does sometimes get sleepy later in the day and has to take a nap on her way home from work.         Review of Systems   Constitutional: Positive for fatigue.   HENT: Positive for congestion and postnasal drip.    Eyes: Negative.    Respiratory: Negative.    Cardiovascular: Negative.    Gastrointestinal: Negative.    Endocrine: Positive for cold intolerance and polydipsia.   Genitourinary: Negative.    Musculoskeletal: Negative.    Skin: Negative.    Allergic/Immunologic: Positive for environmental allergies.   Neurological: Negative.    Hematological: Negative.    Psychiatric/Behavioral: Negative.     [place] score is 18/24  Objective   Vital Signs:   There were no vitals taken for this visit.    Physical Exam patient appears to be awake and relatively alert.  She does not appear to be in acute respiratory distress.  Her stated weight is 270 pounds.  Her height 5 feet 7 inches.  Her body mass index is 30.  Result Review :         Assessment and Plan   Diagnoses and all orders for this visit:    1. Hypersomnia (Primary)    2. Class 1 obesity due to excess calories without serious comorbidity with body mass index (BMI) of 30.0 to 30.9 in adult    3. Snoring    Patient says she is doing fairly well with the Adderall.  She can function well on  her current dose.  She is not having any untoward side effects.  Roney report is reviewed.  We will refill her prescription.  We will plan to see her back in 6 months.  She is to contact us earlier symptoms worsen.  She is try to practice excellent sleep hygiene.  I spent 25 minutes caring for Shelby on this date of service. This time includes time spent by me in the following activities:obtaining and/or reviewing a separately obtained history, performing a medically appropriate examination and/or evaluation , counseling and educating the patient/family/caregiver, ordering medications, tests, or procedures and documenting information in the medical record  Follow Up   Return in about 6 months (around 7/11/2022) for Next scheduled follow-up, Video visit.  Patient was given instructions and counseling regarding her condition or for health maintenance advice. Please see specific information pulled into the AVS if appropriate.   Alberto Marquez MD [facility]  Sleep Medicine  Pulmonary and Critical Care Medicine

## 2022-02-23 DIAGNOSIS — G47.10 HYPERSOMNIA: Primary | ICD-10-CM

## 2022-02-23 RX ORDER — DEXTROAMPHETAMINE SACCHARATE, AMPHETAMINE ASPARTATE, DEXTROAMPHETAMINE SULFATE AND AMPHETAMINE SULFATE 7.5; 7.5; 7.5; 7.5 MG/1; MG/1; MG/1; MG/1
30 TABLET ORAL 2 TIMES DAILY
Qty: 60 TABLET | Refills: 0 | Status: SHIPPED | OUTPATIENT
Start: 2022-02-23 | End: 2022-03-28 | Stop reason: SDUPTHER

## 2022-02-24 NOTE — PROGRESS NOTES
Patient request to on her Ye Sim report is reviewed.  We will refill her prescription  Alberto Marquez MD [facility]  Sleep Medicine  Pulmonary and Critical Care Medicine

## 2022-02-25 ENCOUNTER — TELEPHONE (OUTPATIENT)
Dept: SLEEP MEDICINE | Facility: CLINIC | Age: 42
End: 2022-02-25

## 2022-02-25 NOTE — TELEPHONE ENCOUNTER
TRIED TO CALL PATIENT WITH PRESCRIPTION REQUEST INFORMATION, VM WAS FULL AND UNABLE TO LEAVE MESSAGE

## 2022-03-25 DIAGNOSIS — G47.10 HYPERSOMNIA: ICD-10-CM

## 2022-03-25 RX ORDER — DEXTROAMPHETAMINE SACCHARATE, AMPHETAMINE ASPARTATE, DEXTROAMPHETAMINE SULFATE AND AMPHETAMINE SULFATE 7.5; 7.5; 7.5; 7.5 MG/1; MG/1; MG/1; MG/1
30 TABLET ORAL 2 TIMES DAILY
Qty: 60 TABLET | Refills: 0 | Status: CANCELLED | OUTPATIENT
Start: 2022-03-25 | End: 2022-04-24

## 2022-03-28 DIAGNOSIS — G47.10 HYPERSOMNIA: Primary | ICD-10-CM

## 2022-03-28 RX ORDER — DEXTROAMPHETAMINE SACCHARATE, AMPHETAMINE ASPARTATE, DEXTROAMPHETAMINE SULFATE AND AMPHETAMINE SULFATE 7.5; 7.5; 7.5; 7.5 MG/1; MG/1; MG/1; MG/1
30 TABLET ORAL 2 TIMES DAILY
Qty: 60 TABLET | Refills: 0 | Status: SHIPPED | OUTPATIENT
Start: 2022-03-28 | End: 2022-05-03 | Stop reason: SDUPTHER

## 2022-03-28 NOTE — PROGRESS NOTES
Patient requests refill on adderall. Roney reviewed. Will refill her Rx.  Ablerto Marquez MD [facility]  Sleep Medicine  Pulmonary and Critical Care Medicine

## 2022-03-28 NOTE — TELEPHONE ENCOUNTER
amphetamine-dextroamphetamine (ADDERALL) 30 MG tablet          Sig: Take 1 tablet by mouth 2 (Two) Times a Day for 30 days.

## 2022-04-29 ENCOUNTER — TELEPHONE (OUTPATIENT)
Dept: SLEEP MEDICINE | Facility: HOSPITAL | Age: 42
End: 2022-04-29

## 2022-05-03 DIAGNOSIS — G47.10 HYPERSOMNIA: Primary | ICD-10-CM

## 2022-05-03 RX ORDER — DEXTROAMPHETAMINE SACCHARATE, AMPHETAMINE ASPARTATE, DEXTROAMPHETAMINE SULFATE AND AMPHETAMINE SULFATE 7.5; 7.5; 7.5; 7.5 MG/1; MG/1; MG/1; MG/1
30 TABLET ORAL 2 TIMES DAILY
Qty: 60 TABLET | Refills: 0 | Status: SHIPPED | OUTPATIENT
Start: 2022-05-03 | End: 2022-05-31 | Stop reason: SDUPTHER

## 2022-05-03 NOTE — PROGRESS NOTES
Patient requests a refill on her Adderall.  Roney report is reviewed.  We will refill her prescription  Alberto Marquez MD [facility]  Sleep Medicine  Pulmonary and Critical Care Medicine

## 2022-05-31 DIAGNOSIS — G47.10 HYPERSOMNIA: ICD-10-CM

## 2022-05-31 DIAGNOSIS — G47.10 HYPERSOMNIA: Primary | ICD-10-CM

## 2022-05-31 RX ORDER — DEXTROAMPHETAMINE SACCHARATE, AMPHETAMINE ASPARTATE, DEXTROAMPHETAMINE SULFATE AND AMPHETAMINE SULFATE 7.5; 7.5; 7.5; 7.5 MG/1; MG/1; MG/1; MG/1
30 TABLET ORAL 2 TIMES DAILY
Qty: 60 TABLET | Refills: 0 | OUTPATIENT
Start: 2022-05-31 | End: 2022-06-30

## 2022-05-31 RX ORDER — DEXTROAMPHETAMINE SACCHARATE, AMPHETAMINE ASPARTATE, DEXTROAMPHETAMINE SULFATE AND AMPHETAMINE SULFATE 7.5; 7.5; 7.5; 7.5 MG/1; MG/1; MG/1; MG/1
30 TABLET ORAL 2 TIMES DAILY
Qty: 60 TABLET | Refills: 0 | Status: SHIPPED | OUTPATIENT
Start: 2022-05-31 | End: 2022-06-29 | Stop reason: SDUPTHER

## 2022-05-31 NOTE — PROGRESS NOTES
Patient request refill on her Adderall.  Roney report is reviewed.  We will refill her prescription  Alberto Marquez MD [facility]  Sleep Medicine  Pulmonary and Critical Care Medicine

## 2022-06-29 DIAGNOSIS — G47.10 HYPERSOMNIA: Primary | ICD-10-CM

## 2022-06-29 RX ORDER — DEXTROAMPHETAMINE SACCHARATE, AMPHETAMINE ASPARTATE, DEXTROAMPHETAMINE SULFATE AND AMPHETAMINE SULFATE 7.5; 7.5; 7.5; 7.5 MG/1; MG/1; MG/1; MG/1
30 TABLET ORAL 2 TIMES DAILY
Qty: 60 TABLET | Refills: 0 | Status: SHIPPED | OUTPATIENT
Start: 2022-06-29 | End: 2022-08-03 | Stop reason: SDUPTHER

## 2022-06-29 NOTE — PROGRESS NOTES
Patient request refill on her Adderall.  Roney report is reviewed.  We will refill her prescription  Alberto Marquez MD [facility]  Sleep Medicine  Pulmonary and Critical Care Medicine

## 2022-07-26 ENCOUNTER — TELEPHONE (OUTPATIENT)
Dept: SLEEP MEDICINE | Facility: HOSPITAL | Age: 42
End: 2022-07-26

## 2022-07-26 NOTE — TELEPHONE ENCOUNTER
Caller: Shelby Mcdermott    Relationship to patient: Self    Best call back number: [phone]    Chief complaint: NONE    Type of visit: 6 MO MED    Requested date: PLEASE CALL TO SCHEDULE WILL BE OUT OF MEDICATION IN 5 DAYS.    If rescheduling, when is the original appointment: 7/13/2022    Additional notes: I WASN'T ABLE TO GET MEDICATION NAME PATIENT HAD TO GO INTO A MEETING.   CLAUS

## 2022-08-03 ENCOUNTER — LAB (OUTPATIENT)
Dept: LAB | Facility: HOSPITAL | Age: 42
End: 2022-08-03

## 2022-08-03 ENCOUNTER — OFFICE VISIT (OUTPATIENT)
Dept: SLEEP MEDICINE | Facility: CLINIC | Age: 42
End: 2022-08-03

## 2022-08-03 VITALS
HEART RATE: 76 BPM | WEIGHT: 210 LBS | SYSTOLIC BLOOD PRESSURE: 140 MMHG | OXYGEN SATURATION: 98 % | HEIGHT: 67 IN | DIASTOLIC BLOOD PRESSURE: 69 MMHG | BODY MASS INDEX: 32.96 KG/M2

## 2022-08-03 DIAGNOSIS — G47.10 HYPERSOMNIA: ICD-10-CM

## 2022-08-03 DIAGNOSIS — Z79.899 CONTROLLED SUBSTANCE AGREEMENT SIGNED: ICD-10-CM

## 2022-08-03 DIAGNOSIS — Z79.899 CONTROLLED SUBSTANCE AGREEMENT SIGNED: Primary | ICD-10-CM

## 2022-08-03 LAB
AMPHET+METHAMPHET UR QL: POSITIVE
AMPHETAMINES UR QL: NEGATIVE
BARBITURATES UR QL SCN: NEGATIVE
BENZODIAZ UR QL SCN: NEGATIVE
BUPRENORPHINE SERPL-MCNC: POSITIVE NG/ML
CANNABINOIDS SERPL QL: NEGATIVE
COCAINE UR QL: NEGATIVE
METHADONE UR QL SCN: NEGATIVE
OPIATES UR QL: NEGATIVE
OXYCODONE UR QL SCN: NEGATIVE
PCP UR QL SCN: NEGATIVE
PROPOXYPH UR QL: NEGATIVE
TRICYCLICS UR QL SCN: NEGATIVE

## 2022-08-03 PROCEDURE — 80306 DRUG TEST PRSMV INSTRMNT: CPT

## 2022-08-03 PROCEDURE — 99203 OFFICE O/P NEW LOW 30 MIN: CPT | Performed by: NURSE PRACTITIONER

## 2022-08-03 RX ORDER — DEXTROAMPHETAMINE SACCHARATE, AMPHETAMINE ASPARTATE, DEXTROAMPHETAMINE SULFATE AND AMPHETAMINE SULFATE 7.5; 7.5; 7.5; 7.5 MG/1; MG/1; MG/1; MG/1
30 TABLET ORAL 2 TIMES DAILY
Qty: 120 TABLET | Refills: 0 | Status: SHIPPED | OUTPATIENT
Start: 2022-08-03 | End: 2022-09-27

## 2022-08-03 RX ORDER — ONDANSETRON 4 MG/1
TABLET, ORALLY DISINTEGRATING ORAL
COMMUNITY
Start: 2022-05-30

## 2022-08-03 RX ORDER — ALBUTEROL SULFATE 90 UG/1
2 AEROSOL, METERED RESPIRATORY (INHALATION) EVERY 4 HOURS PRN
COMMUNITY
Start: 2022-04-11

## 2022-08-03 NOTE — PROGRESS NOTES
[facility] Follow Up    CHIEF COMPLAINT    hypersomnia    HISTORY OF PRESENT ILLNESS    Shelby Mcdermott is a 42 y.o.female here today for follow-up.  She was last seen 6 months ago by Dr. Marquez.  She denies any changes to her medical history since her last appointment.    She remains on Adderall twice a day.  She states that she is able to function well on these doses.  Occasionally she will not take her evening dose.    She continues to have a stressful job and tries to get on a regular sleep schedule however on the weekends she does work long hours.    She is unable to exercise regularly due to her long hours.    She had a polysomnogram and MLST in January 2017.    Her [place] Sleepiness Scale is 18/24.  Patient Active Problem List   Diagnosis   • Periodic limb movement disorder   • Hypertension   • Diabetes (HCC)   • Chronic pain   • Hypersomnia   • Snoring       Allergies   Allergen Reactions   • Sulforcin [Resorcinol-Sulfur] Hives   • Sulfa Antibiotics Hives   • Codeine Rash and GI Intolerance       Current Outpatient Medications:   •  albuterol sulfate  (90 Base) MCG/ACT inhaler, Inhale 2 puffs Every 4 (Four) Hours As Needed., Disp: , Rfl:   •  amphetamine-dextroamphetamine (ADDERALL) 30 MG tablet, Take 1 tablet by mouth 2 (Two) Times a Day for 30 days., Disp: 60 tablet, Rfl: 0  •  buprenorphine-naloxone (SUBOXONE) 8-2 MG per SL tablet, , Disp: , Rfl:   •  citalopram (CeleXA) 20 MG tablet, TAKE 1 TABLET BY MOUTH DAILY. (MAKE APPOINTMENT FOR FUTURE FILLS), Disp: , Rfl:   •  estradiol (CLIMARA) 0.075 MG/24HR patch, PLACE 1 PATCH ON THE SKIN AS DIRECTED BY PROVIDER 1 (ONE) TIME PER WEEK., Disp: 4 patch, Rfl: 11  •  ibuprofen (ADVIL,MOTRIN) 800 MG tablet, Take 800 mg by mouth Every 8 (Eight) Hours As Needed., Disp: , Rfl:   •  levothyroxine (SYNTHROID, LEVOTHROID) 25 MCG tablet, Take 25 mcg by mouth Daily., Disp: , Rfl:   •  lisinopril (PRINIVIL,ZESTRIL) 10 MG tablet, Take 10 mg by mouth Daily.,  Disp: , Rfl:   •  metFORMIN ER (GLUCOPHAGE-XR) 500 MG 24 hr tablet, TAKE 2 TABLETS BY MOUTH ONCE DAILY WITH FOOD, Disp: , Rfl:   •  omeprazole (priLOSEC) 40 MG capsule, Take 40 mg by mouth Daily., Disp: , Rfl:   •  ondansetron ODT (ZOFRAN-ODT) 4 MG disintegrating tablet, DISSOLVE 1 TABLET UNDER TONGUE EVERY 8 HOURS AS NEEDED FOR NAUSEA., Disp: , Rfl:   •  rosuvastatin (CRESTOR) 20 MG tablet, , Disp: , Rfl:   •  SUMAtriptan (IMITREX) 100 MG tablet, , Disp: , Rfl:   •  SUMAtriptan (IMITREX) 20 MG/ACT nasal spray, , Disp: , Rfl:   MEDICATION LIST AND ALLERGIES REVIEWED.    Social History     Tobacco Use   • Smoking status: Current Every Day Smoker     Packs/day: 0.50     Years: 22.00     Pack years: 11.00     Types: Cigarettes   • Smokeless tobacco: Never Used   Vaping Use   • Vaping Use: Never used   Substance Use Topics   • Alcohol use: No   • Drug use: No       FAMILY AND SOCIAL HISTORY REVIEWED.    Review of Systems   Constitutional: Positive for fatigue. Negative for activity change, appetite change, fever and unexpected weight change.   HENT: Negative for congestion, postnasal drip, rhinorrhea, sinus pressure, sore throat and voice change.    Eyes: Negative for visual disturbance.   Respiratory: Negative for cough, chest tightness, shortness of breath and wheezing.    Cardiovascular: Negative for chest pain, palpitations and leg swelling.   Gastrointestinal: Negative for abdominal distention, abdominal pain, nausea and vomiting.   Endocrine: Negative for cold intolerance and heat intolerance.   Genitourinary: Negative for difficulty urinating and urgency.   Musculoskeletal: Negative for arthralgias, back pain and neck pain.   Skin: Negative for color change and pallor.   Allergic/Immunologic: Negative for environmental allergies and food allergies.   Neurological: Negative for dizziness, syncope, weakness and light-headedness.   Hematological: Negative for adenopathy. Does not bruise/bleed easily.  "  Psychiatric/Behavioral: Negative for agitation and behavioral problems.   .    /69 (BP Location: Left arm, Patient Position: Sitting, Cuff Size: Adult)   Pulse 76   Ht 170.2 cm (67\")   Wt 95.3 kg (210 lb)   SpO2 98%   BMI 32.89 kg/m²       There is no immunization history on file for this patient.    Physical Exam  Vitals and nursing note reviewed.   Constitutional:       Appearance: She is well-developed. She is not diaphoretic.   HENT:      Head: Normocephalic and atraumatic.   Eyes:      Pupils: Pupils are equal, round, and reactive to light.   Neck:      Thyroid: No thyromegaly.   Cardiovascular:      Rate and Rhythm: Normal rate and regular rhythm.      Heart sounds: Normal heart sounds. No murmur heard.    No friction rub. No gallop.   Pulmonary:      Effort: Pulmonary effort is normal. No respiratory distress.      Breath sounds: Normal breath sounds. No wheezing or rales.   Chest:      Chest wall: No tenderness.   Abdominal:      General: Bowel sounds are normal.      Palpations: Abdomen is soft.      Tenderness: There is no abdominal tenderness.   Musculoskeletal:         General: No swelling. Normal range of motion.      Cervical back: Normal range of motion and neck supple.   Lymphadenopathy:      Cervical: No cervical adenopathy.   Skin:     General: Skin is warm and dry.      Capillary Refill: Capillary refill takes less than 2 seconds.   Neurological:      Mental Status: She is alert and oriented to person, place, and time.   Psychiatric:         Behavior: Behavior normal.       PROBLEM LIST    Problem List Items Addressed This Visit        Sleep    Hypersomnia      Other Visit Diagnoses     Controlled substance agreement signed    -  Primary    Relevant Orders    Urine Drug Screen - Urine, Clean Catch (Completed)            DISCUSSION    Ms. Mcdermott was here today for hypersomnia.  She is tolerating the Adderall currently and does need refills today.  We have had the patient signed a " controlled substance contract in the office today and she is going to get a UDS at the lab today.    I will have one of the physician send a refill for her medications today.    We did discuss good sleep regimen such as laying in the lateral position, avoiding caffeine after lunchtime, getting regular exercise and going to bed at the same time every night.    We did discuss decreasing her stress to help with her home life and she is going to work on this.    She will follow up in 6 months.  I did advise her to call with any additional concerns or questions.    Level of service justified based on 25 minutes spent in patient care on this date of service including, but not limited to: preparing to see the patient, obtaining and/or reviewing history, performing medically appropriate examination, ordering tests/medicine/procedures, independently interpreting results, documenting clinical information in EHR, and counseling/education of patient/family/caregiver. (Level 4 30-39 minutes; Level 5 40-54 minutes)      ANIL Boston  08/03/202215:31 EDT  Electronically signed     Please note that portions of this note were completed with a voice recognition program.        CC: Jay Villarreal DO

## 2022-09-27 DIAGNOSIS — G47.10 HYPERSOMNIA: ICD-10-CM

## 2022-09-27 RX ORDER — DEXTROAMPHETAMINE SACCHARATE, AMPHETAMINE ASPARTATE, DEXTROAMPHETAMINE SULFATE AND AMPHETAMINE SULFATE 7.5; 7.5; 7.5; 7.5 MG/1; MG/1; MG/1; MG/1
TABLET ORAL
Qty: 120 TABLET | Refills: 0 | Status: SHIPPED | OUTPATIENT
Start: 2022-09-27 | End: 2022-11-30

## 2022-11-30 DIAGNOSIS — G47.10 HYPERSOMNIA: ICD-10-CM

## 2022-11-30 RX ORDER — DEXTROAMPHETAMINE SACCHARATE, AMPHETAMINE ASPARTATE, DEXTROAMPHETAMINE SULFATE AND AMPHETAMINE SULFATE 7.5; 7.5; 7.5; 7.5 MG/1; MG/1; MG/1; MG/1
TABLET ORAL
Qty: 120 TABLET | Refills: 0 | Status: SHIPPED | OUTPATIENT
Start: 2022-11-30 | End: 2023-02-03

## 2022-12-22 ENCOUNTER — TELEPHONE (OUTPATIENT)
Dept: OBSTETRICS AND GYNECOLOGY | Facility: CLINIC | Age: 42
End: 2022-12-22

## 2022-12-22 RX ORDER — ESTRADIOL 0.07 MG/D
1 PATCH TRANSDERMAL WEEKLY
Qty: 4 PATCH | Refills: 11 | Status: SHIPPED | OUTPATIENT
Start: 2022-12-22

## 2022-12-22 NOTE — TELEPHONE ENCOUNTER
Caller: LIBAN KEEN    Best call back number: [phone]    Requested Prescriptions: PATCH  Requested Prescriptions      No prescriptions requested or ordered in this encounter        Pharmacy where request should be sent:  [facility]    Additional details provided by patient: COMPLETELY OUT; FEELING SLIGHTLY DIZZY    Does the patient have less than a 3 day supply:  [x] Yes  [] No    Would you like a call back once the refill request has been completed: [x] Yes [] No    If the office needs to give you a call back, can they leave a voicemail: [x] Yes [] No    Starla Puga Rep   12/22/22 11:51 EST

## 2023-02-01 DIAGNOSIS — G47.10 HYPERSOMNIA: ICD-10-CM

## 2023-02-03 DIAGNOSIS — G47.10 HYPERSOMNIA: ICD-10-CM

## 2023-02-03 RX ORDER — DEXTROAMPHETAMINE SACCHARATE, AMPHETAMINE ASPARTATE, DEXTROAMPHETAMINE SULFATE AND AMPHETAMINE SULFATE 7.5; 7.5; 7.5; 7.5 MG/1; MG/1; MG/1; MG/1
TABLET ORAL
Qty: 120 TABLET | Refills: 0 | OUTPATIENT
Start: 2023-02-03

## 2023-02-03 RX ORDER — DEXTROAMPHETAMINE SACCHARATE, AMPHETAMINE ASPARTATE, DEXTROAMPHETAMINE SULFATE AND AMPHETAMINE SULFATE 7.5; 7.5; 7.5; 7.5 MG/1; MG/1; MG/1; MG/1
TABLET ORAL
Qty: 120 TABLET | Refills: 0 | Status: SHIPPED | OUTPATIENT
Start: 2023-02-03 | End: 2023-04-03

## 2023-02-03 NOTE — TELEPHONE ENCOUNTER
Caller: Shelby Mcdermott    Relationship: Self    Best call back number: [phone]    Requested Prescriptions:   Requested Prescriptions     Pending Prescriptions Disp Refills   • amphetamine-dextroamphetamine (ADDERALL) 30 MG tablet 120 tablet 0        Pharmacy where request should be sent:  [facility]    Additional details provided by patient: PT HAS APPT 2/10  Does the patient have less than a 3 day supply:  [x] Yes  [] No    Would you like a call back once the refill request has been completed: [] Yes [x] No    If the office needs to give you a call back, can they leave a voicemail: [] Yes [x] No    Starla Beckford Rep   02/03/23 11:34 EST

## 2023-02-09 NOTE — PROGRESS NOTES
Sleep Clinic Video Visit Follow Up Note    You have chosen to receive care through a telehealth visit.  Do you consent to use a video/audio connection for your medical care today? Yes       Chief Complaint  Follow up an medication check    Subjective     History of Present Illness (from previous encounter on 8/3/2022 with Ms. Melendez):  Shelby Mcdermott is a 42 y.o.female here today for follow-up.  She was last seen 6 months ago by Dr. Marquez.  She denies any changes to her medical history since her last appointment.     She remains on Adderall twice a day.  She states that she is able to function well on these doses.  Occasionally she will not take her evening dose.     She continues to have a stressful job and tries to get on a regular sleep schedule however on the weekends she does work long hours.     She is unable to exercise regularly due to her long hours.     She had a polysomnogram and MLST in January 2017.     Her [place] Sleepiness Scale is 18/24.     Ms. Mcdermott was here today for hypersomnia.  She is tolerating the Adderall currently and does need refills today.  We have had the patient signed a controlled substance contract in the office today and she is going to get a UDS at the lab today.     I will have one of the physician send a refill for her medications today.     We did discuss good sleep regimen such as laying in the lateral position, avoiding caffeine after lunchtime, getting regular exercise and going to bed at the same time every night.     We did discuss decreasing her stress to help with her home life and she is going to work on this.     She will follow up in 6 months.  I did advise her to call with any additional concerns or questions. (End copied text)    Interval History:  Shelby Mcdermott is a 42 y.o. female who presents for follow-up on her Adderall. She as not been falling asleep as much and is keeping a steady schedule. She takes a nap if she gets tired driving and pulls over.          Further details are as follows:    [place] Scale is: 15/24    Weight:    Current Weight: 200 lbs      The patient's relevant past medical, surgical, family, and social history reviewed and updated in Epic as appropriate.    PMH:    Past Medical History:   Diagnosis Date   • Diabetes mellitus (HCC)    • Disease of thyroid gland    • Endometriosis     s/p FAB/BSO   • Hypertension      Past Surgical History:   Procedure Laterality Date   • HYSTERECTOMY       OB History    No obstetric history on file.       Allergies   Allergen Reactions   • Sulforcin [Resorcinol-Sulfur] Hives   • Sulfa Antibiotics Hives   • Codeine Rash and GI Intolerance       MEDS:  Prior to Admission medications    Medication Sig Start Date End Date Taking? Authorizing Provider   albuterol sulfate  (90 Base) MCG/ACT inhaler Inhale 2 puffs Every 4 (Four) Hours As Needed. 4/11/22   Tacho Smith MD   amphetamine-dextroamphetamine (ADDERALL) 30 MG tablet TAKE 1 TABLET BY MOUTH TWICE A DAY 2/3/23   Carlos Strickland MD   buprenorphine-naloxone (SUBOXONE) 8-2 MG per SL tablet  2/12/21   Tacho Smith MD   citalopram (CeleXA) 20 MG tablet TAKE 1 TABLET BY MOUTH DAILY. (MAKE APPOINTMENT FOR FUTURE FILLS) 2/12/21   Tacho Smith MD   estradiol (CLIMARA) 0.075 MG/24HR patch PLACE 1 PATCH ON THE SKIN AS DIRECTED BY PROVIDER 1 (ONE) TIME PER WEEK. 12/22/22   Toi Bauer MD   ibuprofen (ADVIL,MOTRIN) 800 MG tablet Take 800 mg by mouth Every 8 (Eight) Hours As Needed. 9/15/20   Tacho Smith MD   levothyroxine (SYNTHROID, LEVOTHROID) 25 MCG tablet Take 25 mcg by mouth Daily. 11/4/20   Tacho Smith MD   lisinopril (PRINIVIL,ZESTRIL) 10 MG tablet Take 10 mg by mouth Daily. 11/4/20   Tacho Smith MD   metFORMIN ER (GLUCOPHAGE-XR) 500 MG 24 hr tablet TAKE 2 TABLETS BY MOUTH ONCE DAILY WITH FOOD 11/4/20   Tacho Smith MD   omeprazole (priLOSEC) 40 MG capsule Take 40 mg by  "mouth Daily. 11/4/20   Tacho Smith MD   ondansetron ODT (ZOFRAN-ODT) 4 MG disintegrating tablet DISSOLVE 1 TABLET UNDER TONGUE EVERY 8 HOURS AS NEEDED FOR NAUSEA. 5/30/22   Tacho Smith MD   rosuvastatin (CRESTOR) 20 MG tablet  7/8/16   Tacho Smith MD   SUMAtriptan (IMITREX) 100 MG tablet  7/8/16   Tacho Smith MD   SUMAtriptan (IMITREX) 20 MG/ACT nasal spray  12/7/20   Tacho Smith MD         FH:  Family History   Problem Relation Age of Onset   • Cancer Mother         breast   • Hypertension Mother    • Diabetes Father    • Hypertension Father        Objective   Vital Signs:  Ht 170.2 cm (67.01\")   Wt 90.7 kg (200 lb)   BMI 31.32 kg/m²             Physical Exam  Constitutional:       Appearance: Normal appearance.   Neurological:      Mental Status: She is alert and oriented to person, place, and time.   Psychiatric:         Mood and Affect: Mood normal.         Behavior: Behavior normal.         Thought Content: Thought content normal.         Judgment: Judgment normal.             Result Review :              Assessment and Plan  Shelby Mcdermott is a 42 y.o. female presents for follow-up and medication check on Adderall.  Patient has been doing well since her last visit.  Her work schedule has improved and she believes some of her hypersomnia may be attributable to this.  She does not describe side effects at this time.  Urine drug screen and controlled substance contract have been obtained.  Roney has been reviewed.  I will refer refill to sleep MD.  Patient is advised to return for follow-up in approximately 6 months at which time she will need another UDS.    Diagnoses and all orders for this visit:    1. Hypersomnia (Primary)    2. Controlled substance agreement signed    3. Snoring    4. Class 1 obesity due to excess calories without serious comorbidity with body mass index (BMI) of 30.0 to 30.9 in adult                          Follow Up  Return in about 6 " months (around 8/10/2023) for Recheck.  Patient was given instructions and counseling regarding her condition or for health maintenance advice. Please see specific information pulled into the AVS if appropriate.       ANIL Angela, ACNP-BC  Pulmonology, Critical Care, and Sleep Medicine

## 2023-02-10 ENCOUNTER — TELEMEDICINE (OUTPATIENT)
Dept: SLEEP MEDICINE | Facility: CLINIC | Age: 43
End: 2023-02-10
Payer: MEDICAID

## 2023-02-10 VITALS — WEIGHT: 200 LBS | HEIGHT: 67 IN | BODY MASS INDEX: 31.39 KG/M2

## 2023-02-10 DIAGNOSIS — R06.83 SNORING: ICD-10-CM

## 2023-02-10 DIAGNOSIS — Z79.899 CONTROLLED SUBSTANCE AGREEMENT SIGNED: ICD-10-CM

## 2023-02-10 DIAGNOSIS — G47.10 HYPERSOMNIA: Primary | ICD-10-CM

## 2023-02-10 DIAGNOSIS — E66.09 CLASS 1 OBESITY DUE TO EXCESS CALORIES WITHOUT SERIOUS COMORBIDITY WITH BODY MASS INDEX (BMI) OF 30.0 TO 30.9 IN ADULT: ICD-10-CM

## 2023-02-10 PROCEDURE — 99213 OFFICE O/P EST LOW 20 MIN: CPT | Performed by: NURSE PRACTITIONER

## 2023-04-03 DIAGNOSIS — G47.10 HYPERSOMNIA: ICD-10-CM

## 2023-04-03 RX ORDER — DEXTROAMPHETAMINE SACCHARATE, AMPHETAMINE ASPARTATE, DEXTROAMPHETAMINE SULFATE AND AMPHETAMINE SULFATE 7.5; 7.5; 7.5; 7.5 MG/1; MG/1; MG/1; MG/1
TABLET ORAL
Qty: 120 TABLET | Refills: 0 | Status: SHIPPED | OUTPATIENT
Start: 2023-04-03

## 2023-06-02 ENCOUNTER — TELEPHONE (OUTPATIENT)
Dept: SLEEP MEDICINE | Facility: HOSPITAL | Age: 43
End: 2023-06-02
Payer: MEDICAID

## 2023-06-02 DIAGNOSIS — G47.10 HYPERSOMNIA: ICD-10-CM

## 2023-06-02 NOTE — TELEPHONE ENCOUNTER
amphetamine-dextroamphetamine (ADDERALL) 30 MG tablet        Summary: TAKE 1 TABLET BY MOUTH TWICE A DAY      [facility] - [address] - [phone] [facility] [phone]   [phone]

## 2023-06-05 RX ORDER — DEXTROAMPHETAMINE SACCHARATE, AMPHETAMINE ASPARTATE, DEXTROAMPHETAMINE SULFATE AND AMPHETAMINE SULFATE 7.5; 7.5; 7.5; 7.5 MG/1; MG/1; MG/1; MG/1
1 TABLET ORAL 2 TIMES DAILY
Qty: 120 TABLET | Refills: 0 | Status: SHIPPED | OUTPATIENT
Start: 2023-06-05

## 2023-07-27 ENCOUNTER — TELEPHONE (OUTPATIENT)
Dept: SLEEP MEDICINE | Facility: CLINIC | Age: 43
End: 2023-07-27
Payer: MEDICAID

## 2023-07-27 DIAGNOSIS — G47.10 HYPERSOMNIA: ICD-10-CM

## 2023-07-27 RX ORDER — DEXTROAMPHETAMINE SACCHARATE, AMPHETAMINE ASPARTATE, DEXTROAMPHETAMINE SULFATE AND AMPHETAMINE SULFATE 7.5; 7.5; 7.5; 7.5 MG/1; MG/1; MG/1; MG/1
1 TABLET ORAL 2 TIMES DAILY
Qty: 120 TABLET | Refills: 0 | Status: SHIPPED | OUTPATIENT
Start: 2023-07-27

## 2023-07-27 NOTE — TELEPHONE ENCOUNTER
PATIENT WOULD LIKE REFILL OF THE FOLLOWING MEDICATION    amphetamine-dextroamphetamine (ADDERALL) 30 MG tablet [[id number]]    Order Details  Dose: 1 tablet Route: Oral Frequency: 2 Times Daily   Dispense Quantity: 120 tablet Refills: 0          Sig: Take 1 tablet by mouth 2 (Two) Times a Day.     SENT TO:     [facility] - [address] [phone] [facility] [phone]

## 2023-10-03 ENCOUNTER — TELEPHONE (OUTPATIENT)
Dept: SLEEP MEDICINE | Facility: HOSPITAL | Age: 43
End: 2023-10-03
Payer: MEDICAID

## 2023-10-03 NOTE — TELEPHONE ENCOUNTER
PT CALLED AND WANTS HER ADDERALL REFILLED AT [facility] IN [facility] 10/3

## 2023-10-04 ENCOUNTER — OFFICE VISIT (OUTPATIENT)
Dept: SLEEP MEDICINE | Facility: HOSPITAL | Age: 43
End: 2023-10-04
Payer: MEDICAID

## 2023-10-04 VITALS
HEART RATE: 76 BPM | SYSTOLIC BLOOD PRESSURE: 145 MMHG | HEIGHT: 67 IN | BODY MASS INDEX: 32.8 KG/M2 | OXYGEN SATURATION: 94 % | DIASTOLIC BLOOD PRESSURE: 77 MMHG | WEIGHT: 209 LBS

## 2023-10-04 DIAGNOSIS — G47.10 HYPERSOMNIA: Primary | ICD-10-CM

## 2023-10-04 RX ORDER — DEXTROAMPHETAMINE SACCHARATE, AMPHETAMINE ASPARTATE, DEXTROAMPHETAMINE SULFATE AND AMPHETAMINE SULFATE 7.5; 7.5; 7.5; 7.5 MG/1; MG/1; MG/1; MG/1
1 TABLET ORAL 2 TIMES DAILY
Qty: 120 TABLET | Refills: 0 | Status: SHIPPED | OUTPATIENT
Start: 2023-10-04

## 2023-10-04 RX ORDER — RIMEGEPANT SULFATE 75 MG/75MG
1 TABLET, ORALLY DISINTEGRATING ORAL DAILY PRN
COMMUNITY

## 2023-10-04 NOTE — PROGRESS NOTES
Subjective:     Chief Complaint:   Chief Complaint   Patient presents with    Follow-up       HPI:    Shelby Mcdermott is a 43 y.o. female here for follow-up of hypersomnia    She has been followed in our clinic since 2016 though this is the first time I have seen her.  She was initially seen by Dr. Marquez and has subsequently seen by our nurse practitioners after his [facility].    She presented with complaints of somnolence.  She underwent a PSG which was negative and then a subsequent PSG and MSLT combination in January 2017.  The PSG was negative and the MSLT revealed a short sleep latency and no sleep onset REM.  Sleep latency was 3 minutes and 42 seconds.  UDS was positive for Suboxone which she is prescribed.    She has been on stimulant therapy since then.  Initially this was in the form of Ritalin and more recently Adderall.  Despite this she remained sleepy.  During the workweek she sometimes does not get enough sleep but on her days off she will sleep 8 hours or more but does not feel any better.  She has no history of head trauma.  She has no symptoms of nocturnal hallucinations, sleep paralysis, or cataplexy.  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    [place] Scale is: 11/24    Current medications are:   Current Outpatient Medications:     albuterol sulfate  (90 Base) MCG/ACT inhaler, Inhale 2 puffs Every 4 (Four) Hours As Needed., Disp: , Rfl:     amphetamine-dextroamphetamine (ADDERALL) 30 MG tablet, Take 1 tablet by mouth 2 (Two) Times a Day., Disp: 120 tablet, Rfl: 0    buprenorphine-naloxone (SUBOXONE) 8-2 MG per SL tablet, , Disp: , Rfl:     citalopram (CeleXA) 20 MG tablet, TAKE 1 TABLET BY MOUTH DAILY. (MAKE APPOINTMENT FOR FUTURE FILLS), Disp: , Rfl:     estradiol (CLIMARA) 0.075 MG/24HR patch, PLACE 1 PATCH ON THE SKIN AS DIRECTED BY PROVIDER 1 (ONE) TIME PER WEEK., Disp: 4 patch, Rfl: 11    ibuprofen (ADVIL,MOTRIN) 800 MG  tablet, Take 1 tablet by mouth Every 8 (Eight) Hours As Needed., Disp: , Rfl:     levothyroxine (SYNTHROID, LEVOTHROID) 25 MCG tablet, Take 1 tablet by mouth Daily., Disp: , Rfl:     lisinopril (PRINIVIL,ZESTRIL) 10 MG tablet, Take 1 tablet by mouth Daily., Disp: , Rfl:     metFORMIN ER (GLUCOPHAGE-XR) 500 MG 24 hr tablet, TAKE 2 TABLETS BY MOUTH ONCE DAILY WITH FOOD, Disp: , Rfl:     Nurtec 75 MG dispersible tablet, Take 1 tablet by mouth Daily As Needed., Disp: , Rfl:     omeprazole (priLOSEC) 40 MG capsule, Take 1 capsule by mouth Daily., Disp: , Rfl:     ondansetron ODT (ZOFRAN-ODT) 4 MG disintegrating tablet, DISSOLVE 1 TABLET UNDER TONGUE EVERY 8 HOURS AS NEEDED FOR NAUSEA., Disp: , Rfl:     rosuvastatin (CRESTOR) 20 MG tablet, , Disp: , Rfl: .      The patient's relevant past medical, surgical, family and social history were reviewed and updated in Epic as appropriate.     ROS:    Review of Systems      Objective:    Physical Exam  Vitals reviewed.   Constitutional:       Appearance: She is well-developed.   HENT:      Head: Normocephalic and atraumatic.      Mouth/Throat:      Mouth: Mucous membranes are moist.      Pharynx: Oropharynx is clear.      Comments: Upper plate  Poor lower dentition  Class II airway  Neck:      Thyroid: No thyromegaly.   Cardiovascular:      Rate and Rhythm: Normal rate and regular rhythm.      Heart sounds: No murmur heard.    No friction rub. No gallop.   Pulmonary:      Effort: Pulmonary effort is normal. No respiratory distress.      Breath sounds: No wheezing or rales.   Musculoskeletal:      Cervical back: Neck supple.   Skin:     General: Skin is warm and dry.   Neurological:      Mental Status: She is alert and oriented to person, place, and time.   Psychiatric:         Behavior: Behavior normal.         Thought Content: Thought content normal.       DATA:        Assessment:    Problem List Items Addressed This Visit       Hypersomnia - Primary    Relevant Medications     amphetamine-dextroamphetamine (ADDERALL) 30 MG tablet       43-year-old female with history as noted above.  She basically has had a negative sleep work-up including a PSG and MSLT but does have a short sleep latency.  It should be noted that she is on some sedating medications.  Her best diagnosis would be idiopathic hypersomnia without long sleep time.  A simple medication effect seems unlikely and she does not feel better with longer amounts of sleep.  She is very interested in continuing stimulant therapy and I discussed the risks and benefits with her.    Plan:     Continue Adderall 30 mg twice daily  I refilled her medication after checking her eKasper  As above  Continued follow-up    Discussed in detail with the patient.  She will call prior to her follow up visit for any new problems.    Level of Risk Moderate due to: prescription drug management    Signed by  Carlos Strickland MD

## 2023-11-30 ENCOUNTER — TELEPHONE (OUTPATIENT)
Dept: SLEEP MEDICINE | Facility: HOSPITAL | Age: 43
End: 2023-11-30
Payer: MEDICAID

## 2023-11-30 DIAGNOSIS — G47.10 HYPERSOMNIA: ICD-10-CM

## 2023-11-30 RX ORDER — DEXTROAMPHETAMINE SACCHARATE, AMPHETAMINE ASPARTATE, DEXTROAMPHETAMINE SULFATE AND AMPHETAMINE SULFATE 7.5; 7.5; 7.5; 7.5 MG/1; MG/1; MG/1; MG/1
1 TABLET ORAL 2 TIMES DAILY
Qty: 120 TABLET | Refills: 0 | Status: SHIPPED | OUTPATIENT
Start: 2023-11-30

## 2023-11-30 NOTE — TELEPHONE ENCOUNTER
PATIENT CALLED AND REQUESTED REFILL ON     amphetamine-dextroamphetamine (ADDERALL) 30 MG tablet     PATIENT HAS 6 TABLETS REMAINING.    PATIENT USED [facility], [place], [place]

## 2023-12-05 ENCOUNTER — TELEPHONE (OUTPATIENT)
Dept: SLEEP MEDICINE | Facility: HOSPITAL | Age: 43
End: 2023-12-05
Payer: MEDICAID

## 2023-12-05 NOTE — TELEPHONE ENCOUNTER
Patient called and stated that her pharmacy was able to change it to 20 mg for 30 days. Told pt that when she is going to run out to call office.

## 2023-12-05 NOTE — TELEPHONE ENCOUNTER
PT CALLED BC SHE NEEDS A REFILL OF HER ADDERALL. HER PHARMACY DOESN'T HAVE IT BC ITS ON BACK ORDER. SHE WOULD LIKE IT SENT TO [facility] IN [facility] INSTEAD

## 2024-01-16 ENCOUNTER — TELEPHONE (OUTPATIENT)
Dept: SLEEP MEDICINE | Facility: HOSPITAL | Age: 44
End: 2024-01-16

## 2024-01-16 DIAGNOSIS — G47.10 HYPERSOMNIA: Primary | ICD-10-CM

## 2024-01-16 RX ORDER — DEXTROAMPHETAMINE SACCHARATE, AMPHETAMINE ASPARTATE, DEXTROAMPHETAMINE SULFATE AND AMPHETAMINE SULFATE 5; 5; 5; 5 MG/1; MG/1; MG/1; MG/1
20 TABLET ORAL 3 TIMES DAILY
Qty: 90 TABLET | Refills: 0 | Status: SHIPPED | OUTPATIENT
Start: 2024-01-16

## 2024-02-16 DIAGNOSIS — G47.10 HYPERSOMNIA: ICD-10-CM

## 2024-02-22 RX ORDER — DEXTROAMPHETAMINE SACCHARATE, AMPHETAMINE ASPARTATE, DEXTROAMPHETAMINE SULFATE AND AMPHETAMINE SULFATE 5; 5; 5; 5 MG/1; MG/1; MG/1; MG/1
20 TABLET ORAL 3 TIMES DAILY
Qty: 90 TABLET | Refills: 0 | Status: SHIPPED | OUTPATIENT
Start: 2024-02-22

## 2024-03-19 DIAGNOSIS — G47.10 HYPERSOMNIA: ICD-10-CM

## 2024-03-19 RX ORDER — DEXTROAMPHETAMINE SACCHARATE, AMPHETAMINE ASPARTATE, DEXTROAMPHETAMINE SULFATE AND AMPHETAMINE SULFATE 5; 5; 5; 5 MG/1; MG/1; MG/1; MG/1
20 TABLET ORAL 3 TIMES DAILY
Qty: 90 TABLET | Refills: 0 | Status: SHIPPED | OUTPATIENT
Start: 2024-03-19

## 2024-03-19 NOTE — TELEPHONE ENCOUNTER
PT CALLED AND NEEDS A REILL OF HER ADDERALL SENT TO [facility] IN [place], [place]

## 2024-04-18 DIAGNOSIS — G47.10 HYPERSOMNIA: ICD-10-CM

## 2024-04-18 NOTE — TELEPHONE ENCOUNTER
Pt called wanting refill on    amphetamine-dextroamphetamine (ADDERALL) 20 MG tablet     Pt has 4 days left Pharmacy is [facility] in [facility] [phone] Fax [phone]

## 2024-04-23 RX ORDER — DEXTROAMPHETAMINE SACCHARATE, AMPHETAMINE ASPARTATE, DEXTROAMPHETAMINE SULFATE AND AMPHETAMINE SULFATE 5; 5; 5; 5 MG/1; MG/1; MG/1; MG/1
20 TABLET ORAL 3 TIMES DAILY
Qty: 90 TABLET | Refills: 0 | Status: SHIPPED | OUTPATIENT
Start: 2024-04-23

## 2024-05-22 DIAGNOSIS — G47.10 HYPERSOMNIA: ICD-10-CM

## 2024-05-23 RX ORDER — DEXTROAMPHETAMINE SACCHARATE, AMPHETAMINE ASPARTATE, DEXTROAMPHETAMINE SULFATE AND AMPHETAMINE SULFATE 5; 5; 5; 5 MG/1; MG/1; MG/1; MG/1
20 TABLET ORAL 3 TIMES DAILY
Qty: 90 TABLET | Refills: 0 | Status: SHIPPED | OUTPATIENT
Start: 2024-05-23

## 2024-06-20 DIAGNOSIS — G47.10 HYPERSOMNIA: ICD-10-CM

## 2024-06-21 RX ORDER — DEXTROAMPHETAMINE SACCHARATE, AMPHETAMINE ASPARTATE, DEXTROAMPHETAMINE SULFATE AND AMPHETAMINE SULFATE 5; 5; 5; 5 MG/1; MG/1; MG/1; MG/1
20 TABLET ORAL 3 TIMES DAILY
Qty: 90 TABLET | Refills: 0 | Status: SHIPPED | OUTPATIENT
Start: 2024-06-21

## 2024-07-03 ENCOUNTER — OFFICE VISIT (OUTPATIENT)
Dept: SLEEP MEDICINE | Age: 44
End: 2024-07-03
Payer: COMMERCIAL

## 2024-07-03 VITALS
WEIGHT: 218.5 LBS | HEIGHT: 67 IN | HEART RATE: 72 BPM | TEMPERATURE: 98 F | BODY MASS INDEX: 34.29 KG/M2 | SYSTOLIC BLOOD PRESSURE: 128 MMHG | OXYGEN SATURATION: 95 % | RESPIRATION RATE: 16 BRPM | DIASTOLIC BLOOD PRESSURE: 80 MMHG

## 2024-07-03 DIAGNOSIS — G47.12 IDIOPATHIC HYPERSOMNIA WITHOUT LONG SLEEP TIME: Primary | ICD-10-CM

## 2024-07-03 PROCEDURE — 99214 OFFICE O/P EST MOD 30 MIN: CPT | Performed by: INTERNAL MEDICINE

## 2024-07-03 RX ORDER — SUMATRIPTAN 100 MG/1
TABLET, FILM COATED ORAL
COMMUNITY

## 2024-07-03 RX ORDER — FUROSEMIDE 20 MG/1
20 TABLET ORAL DAILY PRN
COMMUNITY

## 2024-07-03 NOTE — PROGRESS NOTES
Subjective:     Chief Complaint:   No chief complaint on file.      HPI:    Shelby Mcdermott is a 44 y.o. female here for follow-up of hypersomnia    She has been followed in our clinic since 2016.  I saw her for the first time in October 2023.  She was initially seen by Dr. Marquez and has subsequently seen by our nurse practitioners after his group home.    She presented with complaints of somnolence.  She underwent a PSG which was negative and then a subsequent PSG and MSLT combination in January 2017.  The PSG was negative and the MSLT revealed a short sleep latency and no sleep onset REM.  Sleep latency was 3 minutes and 42 seconds.  UDS was positive for Suboxone which she was prescribed.    She has been on stimulant therapy in some form since then.  Initially this was in the form of Ritalin and more recently Adderall.  Despite this she remained sleepy.  During the workweek she sometimes does not get enough sleep but on her days off she will sleep 8 hours or more but does not feel any better.  She has no history of head trauma.  She has no symptoms of nocturnal hallucinations, sleep paralysis, or cataplexy.      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  Sometimes she will have to come in quite early to open and stay later.  She says that her daytime somnolence will tend to wax and wane.  Naps are generally not restorative.    [place] Scale is: 17/24    Current medications are:   Current Outpatient Medications:     albuterol sulfate  (90 Base) MCG/ACT inhaler, Inhale 2 puffs Every 4 (Four) Hours As Needed., Disp: , Rfl:     amphetamine-dextroamphetamine (ADDERALL) 20 MG tablet, Take 1 tablet by mouth 3 (Three) Times a Day., Disp: 90 tablet, Rfl: 0    buprenorphine-naloxone (SUBOXONE) 8-2 MG per SL tablet, , Disp: , Rfl:     citalopram (CeleXA) 20 MG tablet, TAKE 1 TABLET BY MOUTH DAILY. (MAKE APPOINTMENT FOR FUTURE FILLS), Disp: , Rfl:     estradiol  (CLIMARA) 0.075 MG/24HR patch, PLACE 1 PATCH ON THE SKIN AS DIRECTED BY PROVIDER 1 (ONE) TIME PER WEEK., Disp: 4 patch, Rfl: 11    furosemide (LASIX) 20 MG tablet, Take 1 tablet by mouth Daily As Needed., Disp: , Rfl:     ibuprofen (ADVIL,MOTRIN) 800 MG tablet, Take 1 tablet by mouth Every 8 (Eight) Hours As Needed., Disp: , Rfl:     levothyroxine (SYNTHROID, LEVOTHROID) 25 MCG tablet, Take 1 tablet by mouth Daily., Disp: , Rfl:     lisinopril (PRINIVIL,ZESTRIL) 10 MG tablet, Take 1 tablet by mouth Daily., Disp: , Rfl:     metFORMIN ER (GLUCOPHAGE-XR) 500 MG 24 hr tablet, TAKE 2 TABLETS BY MOUTH ONCE DAILY WITH FOOD, Disp: , Rfl:     omeprazole (priLOSEC) 40 MG capsule, Take 1 capsule by mouth Daily., Disp: , Rfl:     ondansetron ODT (ZOFRAN-ODT) 4 MG disintegrating tablet, DISSOLVE 1 TABLET UNDER TONGUE EVERY 8 HOURS AS NEEDED FOR NAUSEA., Disp: , Rfl:     rosuvastatin (CRESTOR) 20 MG tablet, , Disp: , Rfl:     SUMAtriptan (IMITREX) 100 MG tablet, TAKE 1 TABLET BY MOUTH AT ONSET OF MIGRAINE, MAY REPEAT ONCE IN 2 HOURS IF NEEDED, Disp: , Rfl: .      The patient's relevant past medical, surgical, family and social history were reviewed and updated in Epic as appropriate.     ROS:    Review of Systems      Objective:    Physical Exam  Vitals reviewed.   Constitutional:       Appearance: She is well-developed.   HENT:      Head: Normocephalic and atraumatic.      Mouth/Throat:      Mouth: Mucous membranes are moist.      Pharynx: Oropharynx is clear.      Comments: Upper plate  Poor lower dentition  Class II airway  Neck:      Thyroid: No thyromegaly.   Cardiovascular:      Rate and Rhythm: Normal rate and regular rhythm.      Heart sounds: No murmur heard.     No friction rub. No gallop.   Pulmonary:      Effort: Pulmonary effort is normal. No respiratory distress.      Breath sounds: No wheezing or rales.   Musculoskeletal:      Cervical back: Neck supple.   Skin:     General: Skin is warm and dry.   Neurological:       Mental Status: She is alert and oriented to person, place, and time.   Psychiatric:         Behavior: Behavior normal.         Thought Content: Thought content normal.         DATA:        Assessment:    Problem List Items Addressed This Visit       Idiopathic hypersomnia without long sleep time - Primary         43-year-old female here for follow-up in the sleep center.  Her workup in the past year revealed a negative PSG and a short sleep latency on MSLT without any sleep onset REM.  It should be noted that she is on some sedating medications for chronic pain.  Her best diagnosis would be idiopathic hypersomnia without long sleep time.  A simple medication effect seems unlikely and she does not feel better with longer amounts of sleep.  She is satisfied with her current therapy and notes no side effects and wishes to continue.    Plan:     Continue Adderall 30 mg twice daily  Zeb reviewed  Medication refills on schedule  Continued follow-up    Discussed in detail with the patient.  She will call prior to her follow up visit for any new problems.    Level of Risk Moderate due to: prescription drug management    Signed by  Carlos Strickland MD

## 2024-07-18 DIAGNOSIS — G47.10 HYPERSOMNIA: ICD-10-CM

## 2024-07-21 RX ORDER — DEXTROAMPHETAMINE SACCHARATE, AMPHETAMINE ASPARTATE, DEXTROAMPHETAMINE SULFATE AND AMPHETAMINE SULFATE 5; 5; 5; 5 MG/1; MG/1; MG/1; MG/1
20 TABLET ORAL 3 TIMES DAILY
Qty: 90 TABLET | Refills: 0 | Status: SHIPPED | OUTPATIENT
Start: 2024-07-21

## 2024-08-18 DIAGNOSIS — G47.10 HYPERSOMNIA: ICD-10-CM

## 2024-08-19 RX ORDER — DEXTROAMPHETAMINE SACCHARATE, AMPHETAMINE ASPARTATE, DEXTROAMPHETAMINE SULFATE AND AMPHETAMINE SULFATE 5; 5; 5; 5 MG/1; MG/1; MG/1; MG/1
20 TABLET ORAL 3 TIMES DAILY
Qty: 180 TABLET | Refills: 0 | Status: SHIPPED | OUTPATIENT
Start: 2024-08-19

## 2024-10-23 DIAGNOSIS — G47.10 HYPERSOMNIA: ICD-10-CM

## 2024-10-24 RX ORDER — DEXTROAMPHETAMINE SACCHARATE, AMPHETAMINE ASPARTATE, DEXTROAMPHETAMINE SULFATE AND AMPHETAMINE SULFATE 5; 5; 5; 5 MG/1; MG/1; MG/1; MG/1
20 TABLET ORAL 3 TIMES DAILY
Qty: 180 TABLET | Refills: 0 | Status: SHIPPED | OUTPATIENT
Start: 2024-10-24

## 2025-01-02 DIAGNOSIS — G47.10 HYPERSOMNIA: ICD-10-CM

## 2025-01-03 RX ORDER — DEXTROAMPHETAMINE SACCHARATE, AMPHETAMINE ASPARTATE, DEXTROAMPHETAMINE SULFATE AND AMPHETAMINE SULFATE 5; 5; 5; 5 MG/1; MG/1; MG/1; MG/1
20 TABLET ORAL 3 TIMES DAILY
Qty: 180 TABLET | Refills: 0 | Status: SHIPPED | OUTPATIENT
Start: 2025-01-03

## 2025-02-06 DIAGNOSIS — G47.10 HYPERSOMNIA: ICD-10-CM

## 2025-02-06 RX ORDER — DEXTROAMPHETAMINE SACCHARATE, AMPHETAMINE ASPARTATE, DEXTROAMPHETAMINE SULFATE AND AMPHETAMINE SULFATE 5; 5; 5; 5 MG/1; MG/1; MG/1; MG/1
20 TABLET ORAL 3 TIMES DAILY
Qty: 93 TABLET | Refills: 0 | Status: SHIPPED | OUTPATIENT
Start: 2025-02-06

## 2025-03-24 DIAGNOSIS — G47.10 HYPERSOMNIA: ICD-10-CM

## 2025-03-25 RX ORDER — DEXTROAMPHETAMINE SACCHARATE, AMPHETAMINE ASPARTATE, DEXTROAMPHETAMINE SULFATE AND AMPHETAMINE SULFATE 5; 5; 5; 5 MG/1; MG/1; MG/1; MG/1
20 TABLET ORAL 3 TIMES DAILY
Qty: 93 TABLET | Refills: 0 | Status: SHIPPED | OUTPATIENT
Start: 2025-03-25

## 2025-04-21 DIAGNOSIS — G47.10 HYPERSOMNIA: ICD-10-CM

## 2025-04-24 RX ORDER — DEXTROAMPHETAMINE SACCHARATE, AMPHETAMINE ASPARTATE, DEXTROAMPHETAMINE SULFATE AND AMPHETAMINE SULFATE 5; 5; 5; 5 MG/1; MG/1; MG/1; MG/1
20 TABLET ORAL 3 TIMES DAILY
Qty: 93 TABLET | Refills: 0 | Status: SHIPPED | OUTPATIENT
Start: 2025-04-24

## 2025-05-21 DIAGNOSIS — G47.10 HYPERSOMNIA: ICD-10-CM

## 2025-05-26 RX ORDER — DEXTROAMPHETAMINE SACCHARATE, AMPHETAMINE ASPARTATE, DEXTROAMPHETAMINE SULFATE AND AMPHETAMINE SULFATE 5; 5; 5; 5 MG/1; MG/1; MG/1; MG/1
20 TABLET ORAL 3 TIMES DAILY
Qty: 93 TABLET | Refills: 0 | Status: SHIPPED | OUTPATIENT
Start: 2025-05-26

## 2025-06-27 DIAGNOSIS — G47.10 HYPERSOMNIA: ICD-10-CM

## 2025-06-27 RX ORDER — DEXTROAMPHETAMINE SACCHARATE, AMPHETAMINE ASPARTATE, DEXTROAMPHETAMINE SULFATE AND AMPHETAMINE SULFATE 5; 5; 5; 5 MG/1; MG/1; MG/1; MG/1
20 TABLET ORAL 3 TIMES DAILY
Qty: 93 TABLET | Refills: 0 | OUTPATIENT
Start: 2025-06-27

## 2025-06-30 DIAGNOSIS — G47.10 HYPERSOMNIA: ICD-10-CM

## 2025-06-30 RX ORDER — DEXTROAMPHETAMINE SACCHARATE, AMPHETAMINE ASPARTATE, DEXTROAMPHETAMINE SULFATE AND AMPHETAMINE SULFATE 5; 5; 5; 5 MG/1; MG/1; MG/1; MG/1
20 TABLET ORAL 3 TIMES DAILY
Qty: 93 TABLET | Refills: 0 | Status: CANCELLED | OUTPATIENT
Start: 2025-06-30

## 2025-07-01 ENCOUNTER — PATIENT MESSAGE (OUTPATIENT)
Dept: SLEEP MEDICINE | Age: 45
End: 2025-07-01
Payer: COMMERCIAL

## 2025-07-02 ENCOUNTER — OFFICE VISIT (OUTPATIENT)
Dept: SLEEP MEDICINE | Age: 45
End: 2025-07-02
Payer: COMMERCIAL

## 2025-07-02 VITALS
SYSTOLIC BLOOD PRESSURE: 128 MMHG | HEIGHT: 67 IN | HEART RATE: 60 BPM | TEMPERATURE: 98.5 F | BODY MASS INDEX: 34.11 KG/M2 | DIASTOLIC BLOOD PRESSURE: 78 MMHG | OXYGEN SATURATION: 94 % | WEIGHT: 217.3 LBS

## 2025-07-02 DIAGNOSIS — G47.19 EXCESSIVE DAYTIME SLEEPINESS: ICD-10-CM

## 2025-07-02 DIAGNOSIS — G47.10 HYPERSOMNIA: ICD-10-CM

## 2025-07-02 DIAGNOSIS — G47.12 IDIOPATHIC HYPERSOMNIA WITHOUT LONG SLEEP TIME: Primary | ICD-10-CM

## 2025-07-02 DIAGNOSIS — E66.9 OBESITY (BMI 30-39.9): ICD-10-CM

## 2025-07-02 RX ORDER — DEXTROAMPHETAMINE SACCHARATE, AMPHETAMINE ASPARTATE, DEXTROAMPHETAMINE SULFATE AND AMPHETAMINE SULFATE 5; 5; 5; 5 MG/1; MG/1; MG/1; MG/1
20 TABLET ORAL 3 TIMES DAILY
Qty: 93 TABLET | Refills: 0 | OUTPATIENT
Start: 2025-07-02

## 2025-07-02 RX ORDER — DEXTROAMPHETAMINE SACCHARATE, AMPHETAMINE ASPARTATE, DEXTROAMPHETAMINE SULFATE AND AMPHETAMINE SULFATE 5; 5; 5; 5 MG/1; MG/1; MG/1; MG/1
20 TABLET ORAL 3 TIMES DAILY
Qty: 93 TABLET | Refills: 0 | Status: SHIPPED | OUTPATIENT
Start: 2025-07-02

## 2025-07-02 NOTE — PROGRESS NOTES
Subjective:     Chief Complaint:   Chief Complaint   Patient presents with    Hypersomnia    Med Management       HPI:    Shelby Mcdermott is a 45 y.o. female here for follow-up of hypersomnia    She has been followed in our clinic since 2016.  I saw her for the first time in October 2023.  She was initially seen by Dr. Marquez and has subsequently been seen by our nurse practitioners after his custodial.    She presented with complaints of somnolence.  She underwent a PSG which was negative and then a subsequent PSG and MSLT combination in January 2017.  The PSG was negative and the MSLT revealed a short sleep latency and no sleep onset REM.  Sleep latency was 3 minutes and 42 seconds.  UDS was positive for Suboxone which she was prescribed.    She has been on stimulant therapy in some form since then.  Initially this was in the form of Ritalin and more recently Adderall.  Despite this she remained sleepy.  During the workweek she sometimes does not get enough sleep but on her days off she will sleep 8 hours or more but does not feel any better.  She has no history of head trauma.  She has no symptoms of nocturnal hallucinations, sleep paralysis, or cataplexy.      She is here for a routine follow-up.  She is a  at [employer] and keeps long hours.  In general they are daytime hours.  She continues on Adderall 60 mg a day.  This has been divided in 3 20 mg doses for ease of access at her pharmacy.  She is noting no side effects.  She continues to benefit from therapy.  Without this medication she is very somnolent.    [place] Scale is: 18/24    Current medications are:   Current Outpatient Medications:     albuterol sulfate  (90 Base) MCG/ACT inhaler, Inhale 2 puffs Every 4 (Four) Hours As Needed., Disp: , Rfl:     amphetamine-dextroamphetamine (ADDERALL) 20 MG tablet, Take 1 tablet by mouth 3 (Three) Times a Day., Disp: 93 tablet, Rfl: 0    buprenorphine-naloxone (SUBOXONE) 8-2 MG per SL  tablet, , Disp: , Rfl:     citalopram (CeleXA) 20 MG tablet, TAKE 1 TABLET BY MOUTH DAILY. (MAKE APPOINTMENT FOR FUTURE FILLS), Disp: , Rfl:     estradiol (CLIMARA) 0.075 MG/24HR patch, PLACE 1 PATCH ON THE SKIN AS DIRECTED BY PROVIDER 1 (ONE) TIME PER WEEK., Disp: 4 patch, Rfl: 11    ibuprofen (ADVIL,MOTRIN) 800 MG tablet, Take 1 tablet by mouth Every 8 (Eight) Hours As Needed., Disp: , Rfl:     levothyroxine (SYNTHROID, LEVOTHROID) 25 MCG tablet, Take 1 tablet by mouth Daily., Disp: , Rfl:     lisinopril (PRINIVIL,ZESTRIL) 10 MG tablet, Take 1 tablet by mouth Daily., Disp: , Rfl:     metFORMIN ER (GLUCOPHAGE-XR) 500 MG 24 hr tablet, TAKE 2 TABLETS BY MOUTH ONCE DAILY WITH FOOD, Disp: , Rfl:     omeprazole (priLOSEC) 40 MG capsule, Take 1 capsule by mouth Daily., Disp: , Rfl:     ondansetron ODT (ZOFRAN-ODT) 4 MG disintegrating tablet, DISSOLVE 1 TABLET UNDER TONGUE EVERY 8 HOURS AS NEEDED FOR NAUSEA., Disp: , Rfl:     rosuvastatin (CRESTOR) 20 MG tablet, , Disp: , Rfl:     SUMAtriptan (IMITREX) 100 MG tablet, TAKE 1 TABLET BY MOUTH AT ONSET OF MIGRAINE, MAY REPEAT ONCE IN 2 HOURS IF NEEDED, Disp: , Rfl:     furosemide (LASIX) 20 MG tablet, Take 1 tablet by mouth Daily As Needed., Disp: , Rfl: .      The patient's relevant past medical, surgical, family and social history were reviewed and updated in Epic as appropriate.     ROS:    Review of Systems      Objective:    Physical Exam  Vitals reviewed.   Constitutional:       Appearance: She is well-developed.   HENT:      Head: Normocephalic and atraumatic.      Mouth/Throat:      Mouth: Mucous membranes are moist.      Pharynx: Oropharynx is clear.      Comments: Upper plate  Poor lower dentition  Class II airway  Neck:      Thyroid: No thyromegaly.   Cardiovascular:      Rate and Rhythm: Normal rate and regular rhythm.      Heart sounds: No murmur heard.     No friction rub. No gallop.   Pulmonary:      Effort: Pulmonary effort is normal. No respiratory distress.       Breath sounds: No wheezing or rales.   Musculoskeletal:      Cervical back: Neck supple.   Skin:     General: Skin is warm and dry.   Neurological:      Mental Status: She is alert and oriented to person, place, and time.   Psychiatric:         Behavior: Behavior normal.         Thought Content: Thought content normal.         DATA:        Assessment:    Problem List Items Addressed This Visit       Idiopathic hypersomnia without long sleep time - Primary    Relevant Medications    amphetamine-dextroamphetamine (ADDERALL) 20 MG tablet     Other Visit Diagnoses         Obesity (BMI 30-39.9)          Excessive daytime sleepiness          Hypersomnia        Relevant Medications    amphetamine-dextroamphetamine (ADDERALL) 20 MG tablet              43-year-old female here for follow-up in the sleep center.  Her workup in the past year revealed a negative PSG and a short sleep latency on MSLT without any sleep onset REM.  It should be noted that she is on some sedating medications for chronic pain.  Her best diagnosis would be idiopathic hypersomnia without long sleep time.  A simple medication effect seems unlikely and she does not feel better with longer amounts of sleep.      She remains satisfied with her current therapy.  She is noting no unusual side effects.  She wishes to continue.    Plan:     Continue Adderall 20 mg 3 times daily  eKasper reviewed and is as expected.  I sent a refill today as she is out of medicines.  Continued follow-up at least every 6 months.    Discussed in detail with the patient.  She will call prior to her follow up visit for any new problems.    Level of Risk Moderate due to: prescription drug management    Signed by  Carlos Strickland MD

## 2025-07-29 DIAGNOSIS — G47.10 HYPERSOMNIA: ICD-10-CM

## 2025-07-31 RX ORDER — DEXTROAMPHETAMINE SACCHARATE, AMPHETAMINE ASPARTATE, DEXTROAMPHETAMINE SULFATE AND AMPHETAMINE SULFATE 5; 5; 5; 5 MG/1; MG/1; MG/1; MG/1
20 TABLET ORAL 3 TIMES DAILY
Qty: 93 TABLET | Refills: 0 | Status: SHIPPED | OUTPATIENT
Start: 2025-07-31